# Patient Record
Sex: FEMALE | Race: WHITE | Employment: OTHER | URBAN - METROPOLITAN AREA
[De-identification: names, ages, dates, MRNs, and addresses within clinical notes are randomized per-mention and may not be internally consistent; named-entity substitution may affect disease eponyms.]

---

## 2023-01-09 ENCOUNTER — HOSPITAL ENCOUNTER (OUTPATIENT)
Dept: PHYSICAL THERAPY | Age: 78
Discharge: HOME | End: 2023-01-09
Payer: MEDICARE

## 2023-01-09 PROCEDURE — 97110 THERAPEUTIC EXERCISES: CPT

## 2023-01-09 PROCEDURE — 97161 PT EVAL LOW COMPLEX 20 MIN: CPT

## 2023-01-09 NOTE — PROGRESS NOTES
[facility] at [address] [place] [address]  Phone: [phone]   Fax: [phone]    Plan of Care/ Statement of Necessity for Physical Therapy Services     Patient name: Yvrose Rodriguez Start of Care: 2023   Referral source: Viki Bay MD : 1945    Medical Diagnosis: Pain in left leg [M79.605]  Displaced fracture of greater tuberosity of left humerus, subsequent encounter for fracture with routine healing [S42.252D]  Displaced intertrochanteric fracture of left femur, subsequent encounter for closed fracture with routine healing [S72.142D]  Pain in left shoulder [M25.512]   Onset Date:10/24/2022    Treatment Diagnosis: Left leg pain, left shoulder pain   Prior Hospitalization: see medical history Provider#: [id number]   Medications: Verified on Patient summary List    Comorbidities: Latex allergy, PCN Allergy, asthma, GERD, Dry eye, osteopenia   Prior Level of Function: 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 Assessment/ key information:   Patient is a 79 yo female that presents with left hip pain and left shoulder pain s/p closed comminuted intertrochanteric fracture with internal fixation and closed displaced fracture of greater tuberosity of the left humerus with routine healing secondary to a trip and fall over a garden hose on 2022. She presnets with reduced ROM of the left hip and left shoulder. She has reduced strength of bilateral UEs and LEs left more than right. She has reduced balance and is a risk for falls. Patient ambulates with modified independence  with 2WW demonstrating an antalgic gait pattern.  Patient presentation is consistent s/p closed comminuted intertrochanteric fracture with internal fixation and closed displaced fracture of greater tuberosity of the left humerus with routine healing. Patient will benefit from skilled PT services to modify and progress therapeutic interventions, address functional mobility deficits, address ROM deficits, address strength deficits, analyze and address soft tissue restrictions, analyze and cue movement patterns, analyze and modify body mechanics/ergonomics and assess and modify postural abnormalities to attain her goals. Evaluation Complexity History HIGH Complexity :3+ comorbidities / personal factors will impact the outcome/ POC ; Examination LOW Complexity : 1-2 Standardized tests and measures addressing body structure, function, activity limitation and / or participation in recreation  ;Presentation LOW Complexity : Stable, uncomplicated  ;Clinical Decision Making MEDIUM Complexity : FOTO score of 26-74  Overall Complexity Rating: LOW   Problem List: pain affecting function, decrease ROM, decrease strength, edema affecting function, impaired gait/ balance, decrease ADL/ functional abilitiies, decrease activity tolerance, decrease flexibility/ joint mobility, and decrease transfer abilities   Treatment Plan may include any combination of the following: Therapeutic exercise, Neuromuscular reeducation, Manual therapy, Therapeutic activity, Self care/home management, Electric stim unattended , Vasopneumatic device, Aquatic therapy, Gait training, and Electric stim attended  Patient / Family readiness to learn indicated by: asking questions, trying to perform skills, and interest  Persons(s) to be included in education: patient (P)  Barriers to Learning/Limitations: None  Measures taken if barriers to learning: NA  Patient Goal (s): get stronger in my legs and arm to get off of the walker  Patient Self Reported Health Status: good  Rehabilitation Potential: good    Short Term Goals:  To be accomplished in 6 weeks:   Patient will report compliance with HEP at least 1x/day to aid in rehabilitation program.   Status at IE: Provided initial HEP   Current:Same as IE     Patient will display right shoulder pain free AROM of 130 degrees into flexion to aid in completion of ADLs. Status at IE: 75 degrees   Current:Same as IE    Long Term Goals: To be accomplished in 12 weeks:   Patient will report compliance with HEP a least 3-4x/week to aid in rehabilitation/strengthening program.   Status at IE: NA   Current:Same as IE     Patient will report no pain greater than 1-2/10 with overhead activities to aid in completion of ADLs. Status at IE: 3/10 cannot reach overhead   Current:Same as IE     Patient will increase bilateral UE/LE strength to 4+/5 throughout all planes to aid in completion of ADLs. Status at IE: 2+/5   Current:Same as IE     Patent will perform 5 sit<>stands pain-free to demonstrate improvement in status and aid and completion of ADLs. Status at IE:Requires UEA   Current:Same as IE     Patient will increase FOTO score to 70 points overall to demonstrate improvement in functional status. Status at IE: 64   Current: Same as IE       Frequency / Duration: Patient to be seen 2 times per week for 12 weeks. Patient/ Caregiver education and instruction: Diagnosis, prognosis, self care, activity modification, and exercises   [x]  Plan of care has been reviewed with PTA    Certification Period: 1/9/2023 - 4/9/2023    Suzanne Bailey PT, DPT 1/9/2023 12:28 PM  _____________________________________________________________________  I certify that the above Therapy Services are being furnished while the patient is under my care. I agree with the treatment plan and certify that this therapy is necessary.     [de-identified] Signature:____________Date:_________TIME:________                                      Idania Gallegos MD    ** Signature, Date and Time must be completed for valid certification **    Please sign and return to   [facility] at [address]  Phone: [phone]   Fax: [phone]

## 2023-01-09 NOTE — PROGRESS NOTES
PT DAILY TREATMENT NOTE/HIP [id number]    Patient Name: Giovany Hemphill  Date:2023  : 1945  [x]  Patient  Verified  Payor: [place] MED[place]ARE / Plan: [place] MEDICARE PART A & B / Product Type: Medicare /    In time:1110  Out time:1210  Total Treatment Time (min): 60  Visit #: 1 of 24    Medicare/BCBS Only   Total Timed Codes (min):  30 1:1 Treatment Time:  60     Treatment Area: Pain in left leg [M79.605]  Pain in left arm [M79.602]  Displaced fracture of greater tuberosity of left humerus, subsequent encounter for fracture with routine healing [S42.252D]  Displaced intertrochanteric fracture of left femur, subsequent encounter for closed fracture with routine healing [S72.142D]    SUBJECTIVE  Pain Level (0-10 scale): 3  []constant [x]intermittent []improving []worsening []no change since onset    Any medication changes, allergies to medications, adverse drug reactions, diagnosis change, or new procedure performed?: [x] No    [] Yes (see summary sheet for update)  Subjective functional status/changes:       Patient presents with left hip pain and left shoulder pain s/p close comminuted intertrochanteric fracture with Internal fixation and closed fracture of greater tuberosity over the left humerus with routine healing secondary trip and fall over a garden hose on 2022. Patient describes pain as soreness, ache. Pain is worse in AM. Denies numbness/tingling. Denies popping/clicking. Aggravating factors:lifting and reaching, prolonged positioning. Alleviating factors: exercise. Denies red flags: SOB, chest pain, dizziness/lightheadedness, blurred/double vision, HA, chills/fevers, night sweats, change in bowel/bladder control, abdominal pain, difficulty swallowing, slurred speech, unexplained weight gain/loss, nausea, vomiting. PMHx: Latex allergy, PCN Allergy, asthma, GERD, Dry eye, osteopenia.  Surgical Hx: right femoral Fx with internal fixation, right humeral fracture with routine healing Social Hx: Lives with spouse in a single story home with one step to enter, work status: Retired. 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 Diagnostic Imaging: routine healing      OBJECTIVE/EXAMINATION    30 min [x]Eval                  []Re-Eval       30 min Therapeutic Exercise:  [x] See flow sheet :   Rationale: increase ROM, increase strength and decrease pain to improve the patients ability to complete ADLs       With   [x] TE   [] TA   [] neuro   [] other: Patient Education: [x] Review HEP    [] Progressed/Changed HEP based on:   [] positioning   [] body mechanics   [] transfers   [] heat/ice application    [] other:        Physical Therapy Evaluation- Hip    Posture: Forward head and rounded shoulders    Gait:  [] Normal    [] Abnormal    [x] Antalgic    [] NWB    Device: 2WW    Describe: Patient ambulates with modified independence  with 2WW demonstrating an antalgic gait pattern.          ROM/Strength        AROM                Strength (1-5)  Hip Left Right Left Right   Flexion 108 115 3+ 4-   Extension 5 15 3 4-   Abduction 25 40 2+ 4-   Adduction   3+ 4-   Knee Left Right Left Right   Extension   4- 4+   Flexion   4- 4                         Other tests/ comments:    UE Strength:   [] Unable to assess at this time                                                                            L (1-5) R (1-5) Pain   Flexion 2+ 4- [] Yes   [] No   Abduction 2+ 4- [] Yes   [] No   ER 2+ 4- [] Yes   [] No   IR 2+ 4- [] Yes   [] No   Extension 3- 4- [] Yes   [] No   Elbow flexion 3+ 4- [] Yes   [] No   Elbow Ext 3+ 4- [] Yes   [] No   Wrist Flexion 3+ 4- [] Yes   [] No   Wrist Extension 3+ 4- [] Yes   [] No   Thumb extension 3+ 4- [] Yes   [] No   Finger Abduction 3+ 4- [] Yes   [] No       Shoulder ROM: Left: flexion 75 degrees, abduction 65 degrees, IR at 0 degrees 80 ER at 0 degrees of abduction 45 degrees/ Right Shoulder flexion 135 degrees, abduction 130 degrees, IR at 0 degrees of abduction 90 ER at 0 degrees 80 degrees    Pain Level (0-10 scale) post treatment: 1    ASSESSMENT/Changes in Function:   Patient presents with left hip pain and left shoulder pain s/p closed comminuted intertrochanteric fracture with internal fixation and closed displaced fracture of greater tuberosity of the left humerus with routine healing secondary to a trip and fall over a garden hose on October 24, 2022. She presnets with reduced ROM of the left hip and left shoulder. She has reduced strength and bilateral UEs and LEs left more than right. She has reduced balance and is a risk for falls. Patient ambulates with modified independence  with 2WW demonstrating an antalgic gait pattern. Patient presentation is consistent s/p closed comminuted intertrochanteric fracture with internal fixation and closed displaced fracture of greater tuberosity of the left humerus with routine healing. Patient will benefit from skilled PT services to modify and progress therapeutic interventions, address functional mobility deficits, address ROM deficits, address strength deficits, analyze and address soft tissue restrictions, analyze and cue movement patterns, analyze and modify body mechanics/ergonomics and assess and modify postural abnormalities to attain hergoals.      [x]  See Plan of Care  []  See progress note/recertification  []  See Discharge Summary         Progress towards goals / Updated goals:  See POC    PLAN  []  Upgrade activities as tolerated     [x]  Continue plan of care  []  Update interventions per flow sheet       []  Discharge due to:_  []  Other:_      Claire Green, PT, DPT 1/9/2023  11:12 AM

## 2023-01-12 ENCOUNTER — HOSPITAL ENCOUNTER (OUTPATIENT)
Dept: PHYSICAL THERAPY | Age: 78
Discharge: HOME | End: 2023-01-12
Payer: MEDICARE

## 2023-01-12 PROCEDURE — 97110 THERAPEUTIC EXERCISES: CPT

## 2023-01-12 PROCEDURE — 97112 NEUROMUSCULAR REEDUCATION: CPT

## 2023-01-12 PROCEDURE — 97530 THERAPEUTIC ACTIVITIES: CPT

## 2023-01-12 NOTE — PROGRESS NOTES
PT DAILY TREATMENT NOTE    Patient Name: Hemanth Maza  Date:2023  : 1945  [x]  Patient  Verified  Payor: Kirsty Pierson / Plan: [place] MEDICARE PART A & B / Product Type: Medicare /    In time:9:58  Out time:10:58  Total Treatment Time (min): 60  Total Timed Codes (min): 60  1:1 Treatment Time (MC/BCBS only): 60   Visit #: 2 of 24    Treatment Dx: Pain in left leg [M79.605]  Displaced fracture of greater tuberosity of left humerus, subsequent encounter for fracture with routine healing [S42.252D]  Displaced intertrochanteric fracture of left femur, subsequent encounter for closed fracture with routine healing [S72.142D]  Pain in left shoulder [M25.512]    SUBJECTIVE  Pain Level (0-10 scale): 2/10 in left shoulder,   Any medication changes, allergies to medications, adverse drug reactions, diagnosis change, or new procedure performed?: [x] No    [] Yes (see summary sheet for update)  Subjective functional status/changes:   [] No changes reported    Patient reports she has decreased her Tylenol use from 9 daily in rehab to 2 daily currently. OBJECTIVE    30 min Therapeutic Exercise:  [] See flow sheet :   Rationale: increase ROM, increase strength, improve coordination, and increase proprioception to improve the patients ability to restore normal ROM for restore PLOF. 15 min Therapeutic Activity:  []  See flow sheet :   Rationale: increase ROM, increase strength, improve coordination, and increase proprioception  to improve the patients ability to restore ability to perform ADLs     15 min Neuromuscular Re-education:  []  See flow sheet :   Rationale: improve coordination, improve balance, and increase proprioception  to improve the patients ability to perform ADLs independently     min Gait Training:  ___ feet with ___ device on level surfaces with ___ level of assistance   Rationale:  To improve ambulation safety and efficiency in order to improve patient's ability to safely ambulate at home for self care. With   [] TE   [] TA   [] neuro   [] other: Patient Education: [x] Review HEP    [] Progressed/Changed HEP based on:   [] positioning   [] body mechanics   [] transfers   [] heat/ice application    [] other:      Other Objective/Functional Measures: see goals     Pain Level (0-10 scale) post treatment: 0    ASSESSMENT/Changes in Function: Patient reports no adverse reaction to therapy. Patient received skilled physical therapy intervention to address restricted shoulder ROM and LE weakness contributing to functional deficits. Initiated therapy program per flow sheet. Heavily educated on decreasing UT compensation and visualizing humeral head rotating in glenoid socket, individualizing to patient's self identification of being a visual learner. Reduced ROM present during SLR flex and clamshell exercises due to muscle weakness. Patient is making good progress towards goals and will benefit from continued therapy to maximize function and restore PLOF. Patient will continue to benefit from skilled PT services to modify and progress therapeutic interventions, address functional mobility deficits, address ROM deficits, address strength deficits, analyze and address soft tissue restrictions, analyze and cue movement patterns, analyze and modify body mechanics/ergonomics, assess and modify postural abnormalities, address imbalance/dizziness, and instruct in home and community integration to attain remaining goals. [x]  See Plan of Care  []  See progress note/recertification  []  See Discharge Summary         Progress towards goals / Updated goals:  Short Term Goals:  To be accomplished in 6 weeks:                 Patient will report compliance with HEP at least 1x/day to aid in rehabilitation program.                 Status at IE: Provided initial HEP                 Current: 1/12/23: Patient reports adherence 2x daily                     Patient will display right shoulder pain free AROM of 130 degrees into flexion to aid in completion of ADLs. Status at IE: 75 degrees                 Current:Same as IE     Long Term Goals: To be accomplished in 12 weeks:                 Patient will report compliance with HEP a least 3-4x/week to aid in rehabilitation/strengthening program.                 Status at IE: NA                 Current:Same as IE                    Patient will report no pain greater than 1-2/10 with overhead activities to aid in completion of ADLs. Status at IE: 3/10 cannot reach overhead                 Current:Same as IE                    Patient will increase bilateral UE/LE strength to 4+/5 throughout all planes to aid in completion of ADLs. Status at IE: 2+/5                 Current:Same as IE                    Patent will perform 5 sit<>stands pain-free to demonstrate improvement in status and aid and completion of ADLs. Status at IE:Requires UEA                 Current:Same as IE                    Patient will increase FOTO score to 70 points overall to demonstrate improvement in functional status.                   Status at IE: 64                 Current: Same as IE    PLAN  []  Upgrade activities as tolerated     [x]  Continue plan of care  []  Update interventions per flow sheet       []  Discharge due to:_  []  Other:_      Suise Mackenzie PT 1/12/2023  9:46 AM    Future Appointments   Date Time Provider Cher Young   1/12/2023 10:00 AM Jeanette [facility]   1/23/2023 11:30 AM Leslie Jorge [facility]   1/27/2023 10:30 AM Leslie Jorge [facility]   1/31/2023  3:30 PM JARRET Waller [facility]

## 2023-01-16 ENCOUNTER — HOSPITAL ENCOUNTER (OUTPATIENT)
Dept: PHYSICAL THERAPY | Age: 78
Discharge: HOME | End: 2023-01-16
Payer: MEDICARE

## 2023-01-16 PROCEDURE — 97530 THERAPEUTIC ACTIVITIES: CPT

## 2023-01-16 PROCEDURE — 97110 THERAPEUTIC EXERCISES: CPT

## 2023-01-16 NOTE — PROGRESS NOTES
PT DAILY TREATMENT NOTE    Patient Name: Flynn Muse  Date:2023  : 1945  [x]  Patient  Verified  Payor: [place] MEDICARE / Plan: [place] MEDICARE PART A & B / Product Type: Medicare /    In time: 1346  Out time: 1115  Total Treatment Time (min): 42  Total Timed Codes (min): 42  1:1 Treatment Time ( only): 29   Visit #: 3 of 24    Treatment Dx: Pain in left leg [M79.605]  Displaced fracture of greater tuberosity of left humerus, subsequent encounter for fracture with routine healing [S42.252D]  Displaced intertrochanteric fracture of left femur, subsequent encounter for closed fracture with routine healing [S72.142D]  Pain in left shoulder [M25.512]    SUBJECTIVE  Pain Level (0-10 scale): left shoulder 1-2, left leg 0  Any medication changes, allergies to medications, adverse drug reactions, diagnosis change, or new procedure performed?: [x] No    [] Yes (see summary sheet for update)  Subjective functional status/changes:   [] No changes reported  \"My  set up a shoulder pulley for me to use at home. I am working at being real consistent with the exercises. \"    OBJECTIVE    15 min Therapeutic Exercise:  [x] See flow sheet :   Rationale: increase ROM, increase strength and decrease pain to improve the patients ability to complete ADLs  ambulation safety and efficiency in order to improve patient's ability to safely ambulate at home for self care.         14 min Therapeutic Activity:  [x]  See flow sheet :   Rationale: increase ROM, increase strength and improve coordination  to improve the patients ability to Complete ADLS     With   [] TE   [] TA   [] neuro   [] other: Patient Education: [x] Review HEP    [] Progressed/Changed HEP based on:   [] positioning   [] body mechanics   [] transfers   [] heat/ice application    [] other:      Other Objective/Functional Measures: NA     Pain Level (0-10 scale) post treatment: left shoulder 1-2, left leg 0    ASSESSMENT/Changes in Function: Patient reports good consistency with HEP. Instructed patient in how to begin advancing exercise intensity as tolerated. Patient responds well to treatment session. No adverse effects were noted from today's treatment session. Patient will continue to benefit from skilled PT services to modify and progress therapeutic interventions, address functional mobility deficits, address ROM deficits, address strength deficits, analyze and address soft tissue restrictions, analyze and cue movement patterns, analyze and modify body mechanics/ergonomics, assess and modify postural abnormalities,  and instruct in home and community integration to attain remaining goals. [x]  See Plan of Care  []  See progress note/recertification  []  See Discharge Summary         Progress towards goals / Updated goals:  Short Term Goals: To be accomplished in 6 weeks:                 Patient will report compliance with HEP at least 1x/day to aid in rehabilitation program.                 Status at IE: Provided initial HEP                 Current: 1/12/23: Patient reports adherence 2x daily                     Patient will display left shoulder pain free AROM of 130 degrees into flexion to aid in completion of ADLs. Status at IE: left shoulder flex 75 degrees, abd 65 degrees                 Current: left shoulder flex 78 degrees, abd remains 65 degrees. 1/16/2023, in progress     Long Term Goals: To be accomplished in 12 weeks:                 Patient will report compliance with HEP a least 3-4x/week to aid in rehabilitation/strengthening program.                 Status at IE: NA                 Current:Same as IE                    Patient will report no pain greater than 1-2/10 with overhead activities to aid in completion of ADLs.                  Status at IE: 3/10 cannot reach overhead                 Current:Same as IE                    Patient will increase bilateral UE/LE strength to 4+/5 throughout all planes to aid in completion of ADLs. Status at IE: 2+/5                 Current:Same as IE                    Patent will perform 5 sit<>stands pain-free to demonstrate improvement in status and aid and completion of ADLs. Status at IE:Requires UEA                 Current:Same as IE                    Patient will increase FOTO score to 70 points overall to demonstrate improvement in functional status.                   Status at IE: 64                 Current: Same as IE    PLAN  []  Upgrade activities as tolerated     [x]  Continue plan of care  []  Update interventions per flow sheet       []  Discharge due to:_  []  Other:_      Nadeem Clayton PT 1/16/2023  11:06 AM    Future Appointments   Date Time Provider Cher Young   1/19/2023  4:00 PM Octavio Wall [facility]   1/23/2023 11:30 AM Dea Jorge [facility]   1/27/2023 10:30 AM Dea Jorge [facility]   1/31/2023  3:30 PM JARRET Fulton [facility]

## 2023-01-19 ENCOUNTER — HOSPITAL ENCOUNTER (OUTPATIENT)
Dept: PHYSICAL THERAPY | Age: 78
Discharge: HOME | End: 2023-01-19
Payer: MEDICARE

## 2023-01-19 PROCEDURE — 97112 NEUROMUSCULAR REEDUCATION: CPT

## 2023-01-19 PROCEDURE — 97110 THERAPEUTIC EXERCISES: CPT

## 2023-01-19 PROCEDURE — 97530 THERAPEUTIC ACTIVITIES: CPT

## 2023-01-19 NOTE — PROGRESS NOTES
PT DAILY TREATMENT NOTE    Patient Name: Viridiana Cho  Date:2023  : 1945  [x]  Patient  Verified  Payor: Alyssa Doyle / Plan: [place] / Product Type: Medicare /    In time:4:05  Out time:5:00  Total Treatment Time (min): 55  Total Timed Codes (min): 55  1:1 Treatment Time (MC/BCBS only): 54   Visit #: 4 of 24    Treatment Dx: Pain in left leg [M79.605]  Displaced fracture of greater tuberosity of left humerus, subsequent encounter for fracture with routine healing [S42.252D]  Displaced intertrochanteric fracture of left femur, subsequent encounter for closed fracture with routine healing [S72.142D]  Pain in left shoulder [M25.512]    SUBJECTIVE  Pain Level (0-10 scale): 1/10 shoulder, 0/10 LE  Any medication changes, allergies to medications, adverse drug reactions, diagnosis change, or new procedure performed?: [x] No    [] Yes (see summary sheet for update)  Subjective functional status/changes:   [] No changes reported    Patient reports she has follow-up appointment with physician who asked her about transitioning to a cane. OBJECTIVE     15 min Therapeutic Exercise:  [] See flow sheet :   Rationale: increase ROM, increase strength, improve coordination, and increase proprioception to improve the patients ability to restore normal ROM for restore PLOF.      13 min Therapeutic Activity:  []  See flow sheet :   Rationale: increase ROM, increase strength, improve coordination, and increase proprioception  to improve the patients ability to restore ability to perform ADLs     27 min Neuromuscular Re-education:  []  See flow sheet :   Rationale: improve coordination, improve balance, and increase proprioception  to improve the patients ability to perform ADLs independently          With   [] TE   [] TA   [] neuro   [] other: Patient Education: [x] Review HEP    [] Progressed/Changed HEP based on:   [] positioning   [] body mechanics   [] transfers   [] heat/ice application    [] other: Other Objective/Functional Measures: see goals     Pain Level (0-10 scale) post treatment: 0    ASSESSMENT/Changes in Function:  Patient reports no adverse reactions to therapy. Skilled therapy intervention addressed left LE strengthening and coordination in prep for decreasing AD usage. Progressed exercises to step exercise for strengthening and functional balance, with patient requiring constant use of parallel bars for balance. Patient also demonstrates decreased eccentric knee control descending from stairs. Patient is making good progress towards goals and will benefit from continued therapy to achieve goals and maximize function/restore PLOF. Patient will continue to benefit from skilled PT services to modify and progress therapeutic interventions, address functional mobility deficits, address ROM deficits, address strength deficits, analyze and address soft tissue restrictions, analyze and cue movement patterns, analyze and modify body mechanics/ergonomics, assess and modify postural abnormalities,  and instruct in home and community integration to attain remaining goals. [x]  See Plan of Care  []  See progress note/recertification  []  See Discharge Summary         Progress towards goals / Updated goals:  Short Term Goals: To be accomplished in 6 weeks:                 Patient will report compliance with HEP at least 1x/day to aid in rehabilitation program.                 Status at IE: Provided initial HEP                 Current: 1/12/23: Patient reports adherence 2x daily                     Patient will display left shoulder pain free AROM of 130 degrees into flexion to aid in completion of ADLs. Status at IE: left shoulder flex 75 degrees, abd 65 degrees                 Current: left shoulder flex 78 degrees, abd remains 65 degrees. 1/16/2023, in progress     Long Term Goals:  To be accomplished in 12 weeks:                 Patient will report compliance with HEP a least 3-4x/week to aid in rehabilitation/strengthening program.                 Status at IE: NA                 Current:Same as IE                    Patient will report no pain greater than 1-2/10 with overhead activities to aid in completion of ADLs. Status at IE: 3/10 cannot reach overhead                 Current:Same as IE                    Patient will increase bilateral UE/LE strength to 4+/5 throughout all planes to aid in completion of ADLs. Status at IE: 2+/5                 Current:Same as IE                    Patent will perform 5 sit<>stands pain-free to demonstrate improvement in status and aid and completion of ADLs. Status at IE:Requires UEA                 Current: 1/19/23: requires use of bilateral UEs to perform STS from 18\" chair                    Patient will increase FOTO score to 70 points overall to demonstrate improvement in functional status.                   Status at IE: 64                 Current: Same as IE    PLAN  []  Upgrade activities as tolerated     [x]  Continue plan of care  []  Update interventions per flow sheet       []  Discharge due to:_  []  Other:_      Funmi Puente, PT 1/19/2023  1:19 PM    Future Appointments   Date Time Provider Cher Young   1/19/2023  4:00 PM [facility]   1/23/2023 11:30 AM Mary Jorge THE [facility]   1/27/2023 10:30 AM Mary Jorge THE [facility]   1/31/2023  3:30 PM JARRET PaulaHPTAMBROSIO THE [facility]

## 2023-01-23 ENCOUNTER — HOSPITAL ENCOUNTER (OUTPATIENT)
Dept: PHYSICAL THERAPY | Age: 78
Discharge: HOME | End: 2023-01-23
Payer: MEDICARE

## 2023-01-23 PROCEDURE — 97530 THERAPEUTIC ACTIVITIES: CPT

## 2023-01-23 PROCEDURE — 97116 GAIT TRAINING THERAPY: CPT

## 2023-01-23 PROCEDURE — 97110 THERAPEUTIC EXERCISES: CPT

## 2023-01-23 PROCEDURE — 97112 NEUROMUSCULAR REEDUCATION: CPT

## 2023-01-23 NOTE — PROGRESS NOTES
PT DAILY TREATMENT NOTE    Patient Name: Amisha William  Date:2023  : 1945  [x]  Patient  Verified  Payor: [place] MEDICARE / Plan: [place] MEDICARE PART A & B / Product Type: Medicare /    In time:1134  Out time:1234  Total Treatment Time (min): 60  Total Timed Codes (min): 60  1:1 Treatment Time (MC/BCBS only): 48   Visit #: 5 of 24    Treatment Dx: Pain in left leg [M79.605]  Displaced fracture of greater tuberosity of left humerus, subsequent encounter for fracture with routine healing [S42.252D]  Displaced intertrochanteric fracture of left femur, subsequent encounter for closed fracture with routine healing [S72.142D]  Pain in left shoulder [M25.512]    SUBJECTIVE  Pain Level (0-10 scale): 2 shoulder, stiff LE  Any medication changes, allergies to medications, adverse drug reactions, diagnosis change, or new procedure performed?: [x] No    [] Yes (see summary sheet for update)  Subjective functional status/changes:   [] No changes reported  \"I haven't done the exercises this morning because I was busy so I am a little more stiff. \"    OBJECTIVE        28 min Therapeutic Exercise:  [x] See flow sheet :   Rationale: increase ROM and increase strength to improve the patients ability to perform daily activities with decreased pain and symptom levels    12 min Therapeutic Activity:  [x]  See flow sheet :   Rationale: increase ROM, increase strength, improve coordination, and increase proprioception  to improve the patients ability to perform daily activities with decreased pain and symptom levels     10 min Neuromuscular Re-education:  [x]  See flow sheet :   Rationale: improve coordination, improve balance, and increase proprioception  to improve the patients ability to perform daily activities with decreased pain and symptom levels    10 min Gait Traininft x2 with SPC in right UE with HHA first trial focusing on heel to toe and coordination of foot to UE, 50ft with RWwith emphasis of decreasing UE support and using heel to toe and continuous gait versus step to   Rationale: To improve ambulation safety and efficiency in order to improve patient's ability to safely ambulate at home for self care. With   [] TE   [] TA   [] neuro   [] other: Patient Education: [x] Review HEP    [] Progressed/Changed HEP based on:   [] positioning   [] body mechanics   [] transfers   [] heat/ice application    [] other:      Other Objective/Functional Measures:   FOTO 53  Unable to clear bottom on left with sl bridge    Lateral lean left > right with gait with SPC with CGA for balance  CGA for standing TB exercises         Pain Level (0-10 scale) post treatment: 0    ASSESSMENT/Changes in Function: Pt tolerated session well with reporting no pain post session. Good tolerance to progressing interventions today however CGA still needed at times with standing activities due to balance. Trialed walking with SPC in clinic today with good response with good coordination however CGA needed for balance. Less UE support noted with walking with RW post session. Patient will continue to benefit from skilled PT services to modify and progress therapeutic interventions, address functional mobility deficits, address ROM deficits, address strength deficits, analyze and cue movement patterns, analyze and modify body mechanics/ergonomics, assess and modify postural abnormalities, address imbalance/dizziness, and instruct in home and community integration to attain remaining goals. [x]  See Plan of Care  []  See progress note/recertification  []  See Discharge Summary         Progress towards goals / Updated goals:  Short Term Goals:  To be accomplished in 6 weeks:                 Patient will report compliance with HEP at least 1x/day to aid in rehabilitation program.                 Status at IE: Provided initial HEP                 Current: 1/12/23: Patient reports adherence 2x daily                     Patient will display left shoulder pain free AROM of 130 degrees into flexion to aid in completion of ADLs. Status at IE: left shoulder flex 75 degrees, abd 65 degrees                 Current: left shoulder flex 78 degrees, abd remains 65 degrees. 1/16/2023, in progress     Long Term Goals: To be accomplished in 12 weeks:                 Patient will report compliance with HEP a least 3-4x/week to aid in rehabilitation/strengthening program.                 Status at IE: NA                 Current:Same as IE                    Patient will report no pain greater than 1-2/10 with overhead activities to aid in completion of ADLs. Status at IE: 3/10 cannot reach overhead                 Current:Same as IE                    Patient will increase bilateral UE/LE strength to 4+/5 throughout all planes to aid in completion of ADLs. Status at IE: 2+/5                 Current:Same as IE                    Patent will perform 5 sit<>stands pain-free to demonstrate improvement in status and aid and completion of ADLs. Status at IE:Requires UEA                 Current: 1/19/23: requires use of bilateral UEs to perform STS from 18\" chair                    Patient will increase FOTO score to 70 points overall to demonstrate improvement in functional status.                   Status at IE: 64                 Current: 48 regression 1/23/23    PLAN  []  Upgrade activities as tolerated     [x]  Continue plan of care  []  Update interventions per flow sheet       []  Discharge due to:_  []  Other:_      Angeli Jorge 1/23/2023  10:46 AM    Future Appointments   Date Time Provider Cher Young   1/23/2023 11:30 AM Angeli Jorge THE [facility]   1/27/2023 10:30 AM Angeli Jorge THE [facility]   1/31/2023  3:30 PM Ricardo Lopez, PT [facility]   2/2/2023 10:00 AM Hubere Loop, PT [facility] THE [facility]   2/7/2023 10:00 AM Juanice Loop, PT [facility] THE [facility]   2/9/2023 10:00 AM Ivaanice Loop, PT [facility]   2/14/2023 10:00 AM Angelito Cordon PT [facility]   2/21/2023 10:00 AM Angelito Cordon PT [facility]   2/23/2023 10:00 AM Angelito Cordon PT [facility]   2/28/2023 10:30 AM Angelito Cordon PT [facility]

## 2023-01-27 ENCOUNTER — HOSPITAL ENCOUNTER (OUTPATIENT)
Dept: PHYSICAL THERAPY | Age: 78
Discharge: HOME | End: 2023-01-27
Payer: MEDICARE

## 2023-01-27 PROCEDURE — 97530 THERAPEUTIC ACTIVITIES: CPT

## 2023-01-27 PROCEDURE — 97116 GAIT TRAINING THERAPY: CPT

## 2023-01-27 PROCEDURE — 97110 THERAPEUTIC EXERCISES: CPT

## 2023-01-27 PROCEDURE — 97112 NEUROMUSCULAR REEDUCATION: CPT

## 2023-01-27 NOTE — PROGRESS NOTES
PT DAILY TREATMENT NOTE    Patient Name: Erin Yañez  Date:2023  : 1945  [x]  Patient  Verified  Payor: [place] MEDICARE / Plan: [place] MEDICARE PART A & B / Product Type: Medicare /    In time:1027  Out time:1128  Total Treatment Time (min): 61  Total Timed Codes (min): 61  1:1 Treatment Time (MC/BCBS only): 62   Visit #: 6 of 24    Treatment Dx: Pain in left leg [M79.605]  Displaced fracture of greater tuberosity of left humerus, subsequent encounter for fracture with routine healing [S42.252D]  Displaced intertrochanteric fracture of left femur, subsequent encounter for closed fracture with routine healing [S72.142D]  Pain in left shoulder [M25.512]    SUBJECTIVE  Pain Level (0-10 scale): 2  Any medication changes, allergies to medications, adverse drug reactions, diagnosis change, or new procedure performed?: [x] No    [] Yes (see summary sheet for update)  Subjective functional status/changes:   [] No changes reported  \"I did a lot yesterday. I see the chiropractor again on Monday to go over the . \"    OBJECTIVE        21 min Therapeutic Exercise:  [x] See flow sheet :   Rationale: increase ROM and increase strength to improve the patients ability to perform daily activities with decreased pain and symptom levels    15 min Therapeutic Activity:  [x]  See flow sheet :   Rationale: increase ROM, increase strength, and improve coordination  to improve the patients ability to perform daily activities with decreased pain and symptom levels     15 min Neuromuscular Re-education:  [x]  See flow sheet :   Rationale: improve coordination, improve balance, and increase proprioception  to improve the patients ability to perform daily activities with decreased pain and symptom levels    10 min Gait Training: with SPC in right with emphasis on increasing righ step length and step through versus step to gait pattern   Rationale:  To improve ambulation safety and efficiency in order to improve patient's ability to safely ambulate at home for self care. With   [] TE   [] TA   [] neuro   [] other: Patient Education: [x] Review HEP    [] Progressed/Changed HEP based on:   [] positioning   [] body mechanics   [] transfers   [] heat/ice application    [] other:      Other Objective/Functional Measures:   30 sec STS: 8 reps with UEA on first rep then none from 18in     Pain Level (0-10 scale) post treatment: 2 left shouder    ASSESSMENT/Changes in Function: Pt tolerated session well with reporting no increase in pain post session. Pt reporting ability to feel LE with sit to stands today with completing 8 reps in 30 sec from 18in. Challenged with maintaining some weight in heels with static balance. Decreased step length noted with bosu lateral step ups with focusing on improving with walking in clinic with [facility] today. Left UE fatigue with supine free weight exercises with more challenged with eccentric control back down to table. Patient will continue to benefit from skilled PT services to modify and progress therapeutic interventions, address functional mobility deficits, address ROM deficits, address strength deficits, analyze and cue movement patterns, analyze and modify body mechanics/ergonomics, assess and modify postural abnormalities, and instruct in home and community integration to attain remaining goals. [x]  See Plan of Care  []  See progress note/recertification  []  See Discharge Summary         Progress towards goals / Updated goals:  Short Term Goals: To be accomplished in 6 weeks:                 Patient will report compliance with HEP at least 1x/day to aid in rehabilitation program.                 Status at IE: Provided initial HEP                 Current: 1/12/23: Patient reports adherence 2x daily                     Patient will display left shoulder pain free AROM of 130 degrees into flexion to aid in completion of ADLs.                  Status at IE: left shoulder flex 75 degrees, abd 65 degrees                 Current: left shoulder flex 78 degrees, abd remains 65 degrees. 1/16/2023, in progress     Long Term Goals: To be accomplished in 12 weeks:                 Patient will report compliance with HEP a least 3-4x/week to aid in rehabilitation/strengthening program.                 Status at IE: NA                 Current:Same as IE                    Patient will report no pain greater than 1-2/10 with overhead activities to aid in completion of ADLs. Status at IE: 3/10 cannot reach overhead                 Current:Same as IE                    Patient will increase bilateral UE/LE strength to 4+/5 throughout all planes to aid in completion of ADLs. Status at IE: 2+/5                 Current:Same as IE                    Patent will perform 5 sit<>stands pain-free to demonstrate improvement in status and aid and completion of ADLs. Status at IE:Requires UEA                 Current: 30 sec STS 8 reps with UEA on first rep then none with no pain goal MET 1/27/23                    Patient will increase FOTO score to 70 points overall to demonstrate improvement in functional status.                   Status at IE: 64                 Current: 48 regression 1/23/23    PLAN  []  Upgrade activities as tolerated     [x]  Continue plan of care  []  Update interventions per flow sheet       []  Discharge due to:_  []  Other:_      Markie Jorge 1/27/2023  10:13 AM    Future Appointments   Date Time Provider Cher Young   1/27/2023 10:30 AM Markie Jorge THE [facility]   1/31/2023  3:30 PM Jl Del Angel PT TG THE [facility]   2/2/2023 10:00 AM [place] [facility] [place] [facility]   2/7/2023 10:00 AM Deepak Chacon PT [facility] [place] [facility]   2/9/2023 10:00 AM Deepak Chacon, PT TG [facility]   2/14/2023 10:00 AM Deepak Chacon, PT [facility] [place] [facility]   2/21/2023 10:00 AM Deepak Chacon, PT TG [facility]   2/23/2023 10:00 AM Deepak Chacon PT [facility]   2/28/2023 10:30 AM Pam Ferro, JARRET [facility]

## 2023-01-31 ENCOUNTER — HOSPITAL ENCOUNTER (OUTPATIENT)
Dept: PHYSICAL THERAPY | Age: 78
Discharge: HOME | End: 2023-01-31
Payer: MEDICARE

## 2023-01-31 PROCEDURE — 97112 NEUROMUSCULAR REEDUCATION: CPT

## 2023-01-31 PROCEDURE — 97110 THERAPEUTIC EXERCISES: CPT

## 2023-01-31 PROCEDURE — 97116 GAIT TRAINING THERAPY: CPT

## 2023-01-31 NOTE — PROGRESS NOTES
PT DAILY TREATMENT NOTE    Patient Name: Gab Mcmillan  Date:2023  : 1945  [x]  Patient  Verified  Payor: [place] MEDICARE / Plan: [place] MEDICARE PART A & B / Product Type: Medicare /    In time:3:35  Out time:4:30  Total Treatment Time (min): 55  Total Timed Codes (min): 55  1:1 Treatment Time (MC/BCBS only): 40   Visit #: 7 of 24    Treatment Dx: Pain in left leg [M79.605]  Displaced fracture of greater tuberosity of left humerus, subsequent encounter for fracture with routine healing [S42.252D]  Displaced intertrochanteric fracture of left femur, subsequent encounter for closed fracture with routine healing [S72.142D]  Pain in left shoulder [M25.512]    SUBJECTIVE  Pain Level (0-10 scale): 1  Any medication changes, allergies to medications, adverse drug reactions, diagnosis change, or new procedure performed?: [x] No    [] Yes (see summary sheet for update)  Subjective functional status/changes:   [] No changes reported        OBJECTIVE       15 min Therapeutic Exercise:  [x] See flow sheet :   Rationale: increase ROM and increase strength to improve the patients ability to perform daily activities with decreased pain and symptom levels     15 min Neuromuscular Re-education:  [x]  See flow sheet :   Rationale: improve coordination, improve balance, and increase proprioception  to improve the patients ability to perform daily activities with decreased pain and symptom levels     10 min Gait Training: with SPC in right with emphasis on increasing righ step length and step through versus step to gait pattern   Rationale: To improve ambulation safety and efficiency in order to improve patient's ability to safely ambulate at home for self care.            With   [] TE   [] TA   [] neuro   [] other: Patient Education: [x] Review HEP    [] Progressed/Changed HEP based on:   [] positioning   [] body mechanics   [] transfers   [] heat/ice application    [] other:      Other Objective/Functional Measures: see goals     Pain Level (0-10 scale) post treatment: 1    ASSESSMENT/Changes in Function: Patient reports no adverse reactions to therapy. Skilled therapy intervention addressed functional balance and retraining weight-bearing on feet to decrease AD usage. Demonstrates left LE weakness with inability to hold body weight for any time with balance without use of UEs. Challenged by all single leg activities. Patient is making good progress towards goals and will benefit from continued therapy to achieve goals and maximize function/restore PLOF. Patient will continue to benefit from skilled PT services to modify and progress therapeutic interventions, address functional mobility deficits, address ROM deficits, address strength deficits, analyze and cue movement patterns, analyze and modify body mechanics/ergonomics, assess and modify postural abnormalities, and instruct in home and community integration to attain remaining goals. [x]  See Plan of Care  []  See progress note/recertification  []  See Discharge Summary         Progress towards goals / Updated goals:  Short Term Goals: To be accomplished in 6 weeks:                 Patient will report compliance with HEP at least 1x/day to aid in rehabilitation program.                 Status at IE: Provided initial HEP                 Current: 1/12/23: Patient reports adherence 2x daily                     Patient will display left shoulder pain free AROM of 130 degrees into flexion to aid in completion of ADLs. Status at IE: left shoulder flex 75 degrees, abd 65 degrees                 Current: left shoulder flex 78 degrees, abd remains 65 degrees. 1/16/2023, in progress     Long Term Goals:  To be accomplished in 12 weeks:                 Patient will report compliance with HEP a least 3-4x/week to aid in rehabilitation/strengthening program.                 Status at IE: NA                 Current:Same as IE                    Patient will report no pain greater than 1-2/10 with overhead activities to aid in completion of ADLs. Status at IE: 3/10 cannot reach overhead                 Current:Same as IE                    Patient will increase bilateral UE/LE strength to 4+/5 throughout all planes to aid in completion of ADLs. Status at IE: 2+/5                 Current: 1/31/23: patient demonstrates poor LE strength necessary for single leg activities                    Patent will perform 5 sit<>stands pain-free to demonstrate improvement in status and aid and completion of ADLs. Status at IE:Requires UEA                 Current: 30 sec STS 8 reps with UEA on first rep then none with no pain goal MET 1/27/23                    Patient will increase FOTO score to 70 points overall to demonstrate improvement in functional status.                   Status at IE: 64                 Current: 48 regression 1/23/23    PLAN  []  Upgrade activities as tolerated     [x]  Continue plan of care  []  Update interventions per flow sheet       []  Discharge due to:_  []  Other:_      Dipti Bustos PT 1/31/2023  1:05 PM    Future Appointments   Date Time Provider Cher Young   1/31/2023  3:30 PM Lavern Denny [facility]   2/2/2023 10:00 AM Lizz Laurieler, PT [facility] THE [facility]   2/7/2023 10:00 AM Lizz Bailer, PT [facility] THE [facility]   2/9/2023 10:00 AM Lizz Bailer, PT [facility]   2/14/2023 10:00 AM Lizz Bailer, PT [facility]   2/21/2023 10:00 AM Lizz Bailer, PT [facility]   2/23/2023 10:00 AM Lizz Bailer, PT [facility]   2/28/2023 10:30 AM Lizz Bailer, PT [facility] THE [facility]

## 2023-02-02 ENCOUNTER — HOSPITAL ENCOUNTER (OUTPATIENT)
Dept: PHYSICAL THERAPY | Age: 78
Discharge: HOME | End: 2023-02-02
Payer: MEDICARE

## 2023-02-02 PROCEDURE — 97116 GAIT TRAINING THERAPY: CPT

## 2023-02-02 PROCEDURE — 97110 THERAPEUTIC EXERCISES: CPT

## 2023-02-02 NOTE — PROGRESS NOTES
PT DAILY TREATMENT NOTE - [facility]     Patient Name: Frieda Sarah  Date:2023  : 1945  [x]  Patient  Verified  Payor: [place] MED[place] / Plan: [place] MEDICARE PART A & B / Product Type: Medicare /    In time:1000  Out time:1030  Total Treatment Time (min): 30  Total Timed Codes (min): 30   1:1 Treatment Time ([address] only): 30   Visit #: 8 of 24    Treatment Area: Pain in left leg [M79.605]  Displaced fracture of greater tuberosity of left humerus, subsequent encounter for fracture with routine healing [S42.252D]  Displaced intertrochanteric fracture of left femur, subsequent encounter for closed fracture with routine healing [S72.142D]  Pain in left shoulder [M25.512]    SUBJECTIVE  Pain Level (0-10 scale): 0  Any medication changes, allergies to medications, adverse drug reactions, diagnosis change, or new procedure performed?: [x] No    [] Yes (see summary sheet for update)  Subjective functional status/changes:   [] No changes reported    Patient reports that she brought her cane in today to make sure it is the right height. She denies pain this morning. OBJECTIVE    20 min Therapeutic Exercise:  [x] See flow sheet :   Rationale: increase ROM, increase strength and decrease pain to improve the patients ability to complete ADLs     10 min Gait Training: with SPC in right with emphasis on increasing righ step length and step through versus step to gait pattern   Rationale: To improve ambulation safety and efficiency in order to improve patient's ability to safely ambulate at home for self care. With   [x] TE   [] TA   [] neuro   [] other: Patient Education: [x] Review HEP    [] Progressed/Changed HEP based on:   [] positioning   [] body mechanics   [] transfers   [] heat/ice application    [] other:      Other Objective/Functional Measures: NA     Pain Level (0-10 scale) post treatment: 0    ASSESSMENT/Changes in Function: Patient responds well to treatment session.  Advanced exercise by introducing seated shoulder flexion with wand. She was challenged with exercise secondary to weakness but reported no pain. Provided cues to focus on step trough gait pattern when ambulating with SPC. Adjusted cane to proper height for safety. No adverse effects were noted from today's treatment session      Patient will continue to benefit from skilled PT services to modify and progress therapeutic interventions, address functional mobility deficits, address ROM deficits, address strength deficits, analyze and address soft tissue restrictions, analyze and cue movement patterns, analyze and modify body mechanics/ergonomics, assess and modify postural abnormalities, address imbalance and instruct in home and community integration to attain remaining goals. []  See Plan of Care  []  See progress note/recertification  []  See Discharge Summary         Progress towards goals / Updated goals:  Short Term Goals: To be accomplished in 6 weeks:                 Patient will report compliance with HEP at least 1x/day to aid in rehabilitation program.                 Status at IE: Provided initial HEP                 Current: 1/12/23: Patient reports adherence 2x daily                     Patient will display left shoulder pain free AROM of 130 degrees into flexion to aid in completion of ADLs. Status at IE: left shoulder flex 75 degrees, abd 65 degrees                 Current: In-progress, AAROM left shoulder flex 120 degrees, abd remains 75 degrees. 1/16/2023       Long Term Goals: To be accomplished in 12 weeks:                 Patient will report compliance with HEP a least 3-4x/week to aid in rehabilitation/strengthening program.                 Status at IE: NA                 Current:Same as IE                    Patient will report no pain greater than 1-2/10 with overhead activities to aid in completion of ADLs.                  Status at IE: 3/10 cannot reach overhead                 Current:Same as IE Patient will increase bilateral UE/LE strength to 4+/5 throughout all planes to aid in completion of ADLs. Status at IE: 2+/5                 Current: 1/31/23: patient demonstrates poor LE strength necessary for single leg activities                    Patent will perform 5 sit<>stands pain-free to demonstrate improvement in status and aid and completion of ADLs. Status at IE:Requires UEA                 Current: 30 sec STS 8 reps with UEA on first rep then none with no pain goal MET 1/27/23                    Patient will increase FOTO score to 70 points overall to demonstrate improvement in functional status.                   Status at IE: 64                 Current: 48 regression 1/23/23    PLAN  []  Upgrade activities as tolerated     []  Continue plan of care  []  Update interventions per flow sheet       []  Discharge due to:_  []  Other:_      Nathalie Graft, PT, DPT 2/2/2023  10:12 AM    Future Appointments   Date Time Provider Cher Young   2/7/2023 10:00 AM Ameya Martines, PT [facility]   2/9/2023 10:00 AM Marcy Martines, PT [facility]   2/14/2023 10:00 AM Marcy Martines, PT [facility]   2/21/2023 10:00 AM Marcy Martines, PT [facility]   2/23/2023 10:00 AM Marcy Martines, PT [facility]   2/28/2023 10:30 AM Marcy Martines, PT [facility]

## 2023-02-07 ENCOUNTER — HOSPITAL ENCOUNTER (OUTPATIENT)
Dept: PHYSICAL THERAPY | Age: 78
Discharge: HOME | End: 2023-02-07
Payer: MEDICARE

## 2023-02-07 PROCEDURE — 97530 THERAPEUTIC ACTIVITIES: CPT

## 2023-02-07 PROCEDURE — 97110 THERAPEUTIC EXERCISES: CPT

## 2023-02-07 PROCEDURE — 97112 NEUROMUSCULAR REEDUCATION: CPT

## 2023-02-07 NOTE — PROGRESS NOTES
PT DAILY TREATMENT NOTE - [facility]     Patient Name: Hugh Carrillo  Date:2023  : 1945  [x]  Patient  Verified  Payor: [place] MEDICARE / Plan: [place] MEDICARE PART A & B / Product Type: Medicare /    In time:1000  Out time:1038  Total Treatment Time (min): 38  Total Timed Codes (min): 38   1:1 Treatment Time ( only): 45   Visit #: 9 of 24    Treatment Area: Pain in left leg [M79.605]  Displaced fracture of greater tuberosity of left humerus, subsequent encounter for fracture with routine healing [S42.252D]  Displaced intertrochanteric fracture of left femur, subsequent encounter for closed fracture with routine healing [S72.142D]  Pain in left shoulder [M25.512]    SUBJECTIVE  Pain Level (0-10 scale): 0  Any medication changes, allergies to medications, adverse drug reactions, diagnosis change, or new procedure performed?: [x] No    [] Yes (see summary sheet for update)  Subjective functional status/changes:   [] No changes reported    Patient reports that she is doing well and is compliant with HEP. She states that she is looking forward to be able to drive.      OBJECTIVE    20 min Therapeutic Exercise:  [x] See flow sheet :   Rationale: increase ROM, increase strength and decrease pain to improve the patients ability to complete ADLs    10 min Therapeutic Activity:  [x]  See flow sheet :   Rationale: increase ROM, increase strength and improve coordination  to improve the patients ability to complete ADLs     8 min Neuromuscular Re-education:  [x]  See flow sheet :   Rationale: improve coordination, improve balance and increase proprioception  to improve the patients ability to complete ADLs        With   [x] TE   [] TA   [] neuro   [] other: Patient Education: [x] Review HEP    [] Progressed/Changed HEP based on:   [] positioning   [] body mechanics   [] transfers   [] heat/ice application    [] other:      Other Objective/Functional Measures: NA     Pain Level (0-10 scale) post treatment: 0    ASSESSMENT/Changes in Function:     Patient responds well to treatment session. Patient required cues to reduce UEA with STS. She was challenged with exercise exercise. Advanced resistance with UE activities as she was demonstrating improved activity tolerance. She continues to be limited with AROM of the left shoulder. Will reassess next visit. No adverse effects were noted from today's treatment session    Patient will continue to benefit from skilled PT services to modify and progress therapeutic interventions, address functional mobility deficits, address ROM deficits, address strength deficits, analyze and address soft tissue restrictions, analyze and cue movement patterns, analyze and modify body mechanics/ergonomics, assess and modify postural abnormalities, address imbalance and instruct in home and community integration to attain remaining goals. []  See Plan of Care  []  See progress note/recertification  []  See Discharge Summary         Progress towards goals / Updated goals:  Short Term Goals: To be accomplished in 6 weeks:                 Patient will report compliance with HEP at least 1x/day to aid in rehabilitation program.                 Status at IE: Provided initial HEP                 Current: Met, 2/7/2023                    Patient will display left shoulder pain free AROM of 130 degrees into flexion to aid in completion of ADLs. Status at IE: left shoulder flex 75 degrees, abd 65 degrees                 Current: In-progress, AAROM left shoulder flex 120 degrees, and 110 degrees AROM.     2/7/2023                    Long Term Goals:  To be accomplished in 12 weeks:                 Patient will report compliance with HEP a least 3-4x/week to aid in rehabilitation/strengthening program.                 Status at IE: NA                 Current:Same as IE                    Patient will report no pain greater than 1-2/10 with overhead activities to aid in completion of ADLs.                 Status at IE: 3/10 cannot reach overhead                 Current: In-progress 0-3/10 but ROM is limited to 110 degrees of shoulder flexion, 2/7/2023                    Patient will increase bilateral UE/LE strength to 4+/5 throughout all planes to aid in completion of ADLs. Status at IE: 2+/5                 Current: In-progress, 4-/5, 2/7/2023                    Patent will perform 5 sit<>stands pain-free to demonstrate improvement in status and aid and completion of ADLs. Status at IE:Requires UEA                 Current: 30 sec STS 8 reps with UEA on first rep then none with no pain goal MET 1/27/23                    Patient will increase FOTO score to 70 points overall to demonstrate improvement in functional status.                   Status at IE: 64                 Current: 48 regression 1/23/23    PLAN  []  Upgrade activities as tolerated     [x]  Continue plan of care  []  Update interventions per flow sheet       []  Discharge due to:_  []  Other:_      Tangela Mcmillan PT, DPT 2/7/2023  10:13 AM    Future Appointments   Date Time Provider Cher Young   2/9/2023 10:00 AM Taylor Roles, PT [facility]   2/14/2023 10:00 AM Taylor Roles, PT [facility]   2/21/2023 10:00 AM Taylor Roles, PT [facility]   2/23/2023 10:00 AM Taylor Roles, PT [facility]   2/28/2023 10:30 AM Taylor Roles, PT [facility]

## 2023-02-09 ENCOUNTER — HOSPITAL ENCOUNTER (OUTPATIENT)
Dept: PHYSICAL THERAPY | Age: 78
Discharge: HOME | End: 2023-02-09
Payer: MEDICARE

## 2023-02-09 PROCEDURE — 97530 THERAPEUTIC ACTIVITIES: CPT

## 2023-02-09 PROCEDURE — 97110 THERAPEUTIC EXERCISES: CPT

## 2023-02-09 PROCEDURE — 97112 NEUROMUSCULAR REEDUCATION: CPT

## 2023-02-09 NOTE — PROGRESS NOTES
PT DAILY TREATMENT NOTE - [facility]     Patient Name: Claudell Bailer  Date:2023  : 1945  [x]  Patient  Verified  Payor: [place] MEDICARE / Plan: [place] MEDICARE PART A & B / Product Type: Medicare /    In time:1000  Out time:1055  Total Treatment Time (min): 55  Total Timed Codes (min): 55   1:1 Treatment Time ( [address] only): 54   Visit #: 10 of 24    Treatment Area: Pain in left leg [M79.605]  Displaced fracture of greater tuberosity of left humerus, subsequent encounter for fracture with routine healing [S42.252D]  Displaced intertrochanteric fracture of left femur, subsequent encounter for closed fracture with routine healing [S72.142D]  Pain in left shoulder [M25.512]    SUBJECTIVE  Pain Level (0-10 scale): 0  Any medication changes, allergies to medications, adverse drug reactions, diagnosis change, or new procedure performed?: [x] No    [] Yes (see summary sheet for update)  Subjective functional status/changes:   [] No changes reported    Patient reports that she is doing well and is ambulating in her home with a cane. She states that she is feeling stronger.      OBJECTIVE    25 min Therapeutic Exercise:  [x] See flow sheet :   Rationale: increase ROM, increase strength and decrease pain to improve the patients ability to complete ADLs    10 min Therapeutic Activity:  [x]  See flow sheet :   Rationale: increase ROM, increase strength and improve coordination  to improve the patients ability to complete ADLs     20 min Neuromuscular Re-education:  [x]  See flow sheet :   Rationale: improve coordination, improve balance and increase proprioception  to improve the patients ability to complete ADLs          With   [x] TE   [] TA   [] neuro   [] other: Patient Education: [x] Review HEP    [] Progressed/Changed HEP based on:   [] positioning   [] body mechanics   [] transfers   [] heat/ice application    [] other:      Other Objective/Functional Measures:     FOTO 23   MMT 4-/5 globally bilateral UE and LEs  Shoulder ROM Patent will perform 5 sit<>stands pain-free to demonstrate improvement in status and aid and completion of ADLs. 12 STS in 30 seconds  ROM AAROM left shoulder flex 140 degrees, and 115 degrees AROM    Pain Level (0-10 scale) post treatment: 0    ASSESSMENT/Changes in Function:     Patient responds well to treatment session. No adverse effects were noted from today's treatment session. Patient presents with left hip pain and left shoulder pain s/p closed comminuted intertrochanteric fracture with internal fixation and closed displaced fracture of greater tuberosity of the left humerus with routine healing secondary to a trip and fall over a garden hose on October 24, 2022 Patient is improving as she has met 1/2 STGs and 1/6 LTGs. She is developing strength and ROM resulting in improvised activity tolerance. She begun to transition to [facility] when ambulating on even surfaces. She is performing AAROM of the left shoulder but is limited in strength and functional mobility. Patient will continue to benefit from skilled PT services to modify and progress therapeutic interventions, address functional mobility deficits, address ROM deficits, address strength deficits, analyze and address soft tissue restrictions, analyze and cue movement patterns, analyze and modify body mechanics/ergonomics, assess and modify postural abnormalities, address imbalance and instruct in home and community integration to attain remaining goals. []  See Plan of Care  []  See progress note/recertification  []  See Discharge Summary         Progress towards goals / Updated goals:  Short Term Goals:  To be accomplished in 6 weeks:                 Patient will report compliance with HEP at least 1x/day to aid in rehabilitation program.                 Status at IE: Provided initial HEP                 Current: Met, 2/7/2023                    Patient will display left shoulder pain free AROM of 130 degrees into flexion to aid in completion of ADLs. Status at IE: left shoulder flex 75 degrees, abd 65 degrees                 Current: In-progress, AAROM left shoulder flex 140 degrees, and 115 degrees AROM.     2/9/2023                    Long Term Goals: To be accomplished in 12 weeks:                 Patient will report compliance with HEP a least 3-4x/week to aid in rehabilitation/strengthening program.                 Status at IE: NA                 Current:Same as IE                    Patient will report no pain greater than 1-2/10 with overhead activities to aid in completion of ADLs. Status at IE: 3/10 cannot reach overhead                 Current: In-progress, 0-3/10 but ROM is limited to 110 degrees of shoulder flexion, 2/7/2023                    Patient will increase bilateral UE/LE strength to 4+/5 throughout all planes to aid in completion of ADLs. Status at IE: 2+/5                 Current: In-progress, 4-/5, 2/7/2023                    Patent will perform 5 sit<>stands pain-free to demonstrate improvement in status and aid and completion of ADLs. Status at IE:Requires UEA                 Current: Met, 30 sec STS 8 reps with UEA on first rep then none with no pain, 1/27/23         Patent will perform 16 STS in 30 seconds with no UE and pain-free to demonstrate improvement in status and aid and completion of ADLs. Goal added 2/9/2023       Progress note:  12 STS, 2/9/2023       Current: NA                    Patient will increase FOTO score to 70 points overall to demonstrate improvement in functional status. Status at IE: 64                 Current:  In-progress, 66, 2/9/2023     PLAN  []  Upgrade activities as tolerated     [x]  Continue plan of care  []  Update interventions per flow sheet       []  Discharge due to:_  []  Other:_      Phil Chen, PT, DPT 2/9/2023  10:03 AM    Future Appointments   Date Time Provider Cher Young 2/14/2023 10:00 AM Reginold [place], PT [facility]   2/21/2023 10:00 AM Reginold [place], PT [facility]   2/23/2023 10:00 AM Reginold [place], PT [facility]   2/28/2023 10:30 AM Reginold [place], PT [facility]

## 2023-02-14 ENCOUNTER — HOSPITAL ENCOUNTER (OUTPATIENT)
Facility: HOSPITAL | Age: 78
Setting detail: RECURRING SERIES
Discharge: HOME | End: 2023-02-17
Payer: MEDICARE

## 2023-02-14 ENCOUNTER — APPOINTMENT (OUTPATIENT)
Dept: PHYSICAL THERAPY | Age: 78
End: 2023-02-14
Payer: MEDICARE

## 2023-02-14 PROCEDURE — 97530 THERAPEUTIC ACTIVITIES: CPT

## 2023-02-14 PROCEDURE — 97110 THERAPEUTIC EXERCISES: CPT

## 2023-02-14 PROCEDURE — 97112 NEUROMUSCULAR REEDUCATION: CPT

## 2023-02-14 NOTE — PROGRESS NOTES
PHYSICAL / OCCUPATIONAL THERAPY - DAILY TREATMENT NOTE (updated )    Patient Name: Candis Ruiz    Date: 2023    : 1945  Insurance: Payor: MEDICARE / Plan: MEDICARE PART A AND B / Product Type: *No Product type* /      Patient  verified Yes     Visit #   Current / Total 11 24   Time   In / Out 1000 1055   Pain   In / Out 0 1   Subjective Functional Status/Changes: Patient reports that her knee is feeling better. She states that she still feels safest with her walker. Changes to:  Meds, Allergies, Med Hx, Sx Hx? If yes, update Summary List no       TREATMENT AREA =  No admission diagnoses are documented for this encounter. OBJECTIVE      Therapeutic Procedures: Tx Min Billable or 1:1 Min (if diff from Tx Min) Procedure, Rationale, Specifics   25  43305 Therapeutic Exercise (timed):  increase ROM, strength, coordination, balance, and proprioception to improve patient's ability to progress to PLOF and address remaining functional goals. (see flow sheet as applicable)     Details if applicable:       20  66580 Therapeutic Activity (timed):  use of dynamic activities replicating functional movements to increase ROM, strength, coordination, balance, and proprioception in order to improve patient's ability to progress to PLOF and address remaining functional goals. (see flow sheet as applicable)     Details if applicable:     20  04581 Neuromuscular Re-Education (timed):  improve balance, coordination, kinesthetic sense, posture, core stability and proprioception to improve patient's ability to develop conscious control of individual muscles and awareness of position of extremities in order to progress to PLOF and address remaining functional goals.  (see flow sheet as applicable)     Details if applicable:     54  [address] Reminder: bill using total billable min of TIMED therapeutic procedures (example: do not include dry needle or estim unattended, both untimed codes, in totals to left)  8-22 min = 1 unit; 23-37 min = 2 units; 38-52 min = 3 units; 53-67 min = 4 units; 68-82 min = 5 units   Total Total     [x]  Patient Education billed concurrently with other procedures   [x] Review HEP    [] Progressed/Changed HEP, detail:    [] Other detail:       Objective Information/Functional Measures/Assessment    Patient demonstrated improved activity tolerance. Advanced exercise by increasing resistance during UE activities. Also had patient perform wand activities in standing too challenge dynamic stability. Provided CGA with gait belt during balance activities. Will advance exercise as tolerated. Patient will continue to benefit from skilled PT / OT services to modify and progress therapeutic interventions, analyze and address functional mobility deficits, analyze and address ROM deficits, analyze and address strength deficits, analyze and address soft tissue restrictions, analyze and cue for proper movement patterns, analyze and modify for postural abnormalities, analyze and address imbalance/dizziness, and instruct in home and community integration to address functional deficits and attain remaining goals. Progress toward goals / Updated goals:  []  See Progress Note/Recertification    Short Term Goals: To be accomplished in 6 weeks:                 Patient will report compliance with HEP at least 1x/day to aid in rehabilitation program.                 Status at IE: Provided initial HEP                 Current: Met, 2/7/2023                    Patient will display left shoulder pain free AROM of 130 degrees into flexion to aid in completion of ADLs. Status at IE: left shoulder flex 75 degrees, abd 65 degrees                 Current: In-progress, AAROM left shoulder flex 140 degrees, and 115 degrees AROM.     2/9/2023                    Long Term Goals:  To be accomplished in 12 weeks:                 Patient will report compliance with HEP a least 3-4x/week to aid in rehabilitation/strengthening program.                 Status at IE: NA                 Current:Same as IE                    Patient will report no pain greater than 1-2/10 with overhead activities to aid in completion of ADLs. Status at IE: 3/10 cannot reach overhead                 Current: In-progress, 0-3/10 but ROM is limited to 110 degrees of shoulder flexion, 2/7/2023                    Patient will increase bilateral UE/LE strength to 4+/5 throughout all planes to aid in completion of ADLs. Status at IE: 2+/5                 Current: In-progress, 4-/5, 2/7/2023                    Patent will perform 5 sit<>stands pain-free to demonstrate improvement in status and aid and completion of ADLs. Status at IE:Requires UEA                 Current: Met, 30 sec STS 8 reps with UEA on first rep then none with no pain, 1/27/23                     Patent will perform 16 STS in 30 seconds with no UE and pain-free to demonstrate improvement in status and aid and completion of ADLs. Goal added 2/9/2023                  Progress note:  12 STS, 2/9/2023                  Current: NA                     Patient will increase FOTO score to 70 points overall to demonstrate improvement in functional status. Status at IE: 64                 Current:  In-progress, 77, 2/9/2023    PLAN  Yes  Continue plan of care  []  Upgrade activities as tolerated  []  Discharge due to :  []  Other:    Anson Pizano, PT    2/14/2023    10:14 AM    Future Appointments   Date Time Provider Irineo Wilde   2/21/2023 10:00 AM ANA M Carrera [facility]   2/23/2023 10:00 AM ANA M Carrera [facility]   2/28/2023 10:30 AM Amaya Hager [facility]

## 2023-02-21 ENCOUNTER — HOSPITAL ENCOUNTER (OUTPATIENT)
Facility: HOSPITAL | Age: 78
Setting detail: RECURRING SERIES
Discharge: HOME | End: 2023-02-24
Payer: MEDICARE

## 2023-02-21 ENCOUNTER — APPOINTMENT (OUTPATIENT)
Dept: PHYSICAL THERAPY | Age: 78
End: 2023-02-21
Payer: MEDICARE

## 2023-02-21 PROCEDURE — 97112 NEUROMUSCULAR REEDUCATION: CPT

## 2023-02-21 PROCEDURE — 97530 THERAPEUTIC ACTIVITIES: CPT

## 2023-02-21 PROCEDURE — 97110 THERAPEUTIC EXERCISES: CPT

## 2023-02-21 NOTE — PROGRESS NOTES
PHYSICAL / OCCUPATIONAL THERAPY - DAILY TREATMENT NOTE (updated )    Patient Name: Mary Espino    Date: 2023    : 1945  Insurance: Payor: MEDICARE / Plan: MEDICARE PART A AND B / Product Type: *No Product type* /      Patient  verified Yes   Visit #   Current / Total 12 24   Time   In / Out 0952 1050   Pain   In / Out 0 0   Subjective Functional Status/Changes: Patient reports that her arms are sore from performing exercise while out of town. Changes to:  Meds, Allergies, Med Hx, Sx Hx? If yes, update Summary List no       TREATMENT AREA =  No admission diagnoses are documented for this encounter. OBJECTIVE    Therapeutic Procedures: Tx Min Billable or 1:1 Min (if diff from Tx Min) Procedure, Rationale, Specifics   25  28297 Therapeutic Exercise (timed):  increase ROM, strength, coordination, balance, and proprioception to improve patient's ability to progress to PLOF and address remaining functional goals. (see flow sheet as applicable)     Details if applicable:       13  37998 Therapeutic Activity (timed):  use of dynamic activities replicating functional movements to increase ROM, strength, coordination, balance, and proprioception in order to improve patient's ability to progress to PLOF and address remaining functional goals. (see flow sheet as applicable)     Details if applicable:     15  07309 Neuromuscular Re-Education (timed):  improve balance, coordination, kinesthetic sense, posture, core stability and proprioception to improve patient's ability to develop conscious control of individual muscles and awareness of position of extremities in order to progress to PLOF and address remaining functional goals.  (see flow sheet as applicable)     Details if applicable:     62  [address] Totals Reminder: bill using total billable min of TIMED therapeutic procedures (example: do not include dry needle or estim unattended, both untimed codes, in totals to left)  8-22 min = 1 unit; 23-37 min = 2 units; 38-52 min = 3 units; 53-67 min = 4 units; 68-82 min = 5 units   Total Total     [x]  Patient Education billed concurrently with other procedures   [x] Review HEP    [] Progressed/Changed HEP, detail:    [] Other detail:       Objective Information/Functional Measures/Assessment    Had patient perform rows and shoulder extension in standing today to challenge dynamic stability. Provided close supervision for safety. Provided tactile cues to promote scapular retraction during rows. Patient was challenged with obstacle course and required occasional CGA for safety. Patient will continue to benefit from skilled PT / OT services to modify and progress therapeutic interventions, analyze and address functional mobility deficits, analyze and address ROM deficits, analyze and address strength deficits, analyze and address soft tissue restrictions, analyze and cue for proper movement patterns, analyze and modify for postural abnormalities, analyze and address imbalance/dizziness, and instruct in home and community integration to address functional deficits and attain remaining goals. Progress toward goals / Updated goals:  []  See Progress Note/Recertification    Short Term Goals: To be accomplished in 6 weeks:                 Patient will report compliance with HEP at least 1x/day to aid in rehabilitation program.                 Status at IE: Provided initial HEP                 Current: Met, 2/7/2023                    Patient will display left shoulder pain free AROM of 130 degrees into flexion to aid in completion of ADLs. Status at IE: left shoulder flex 75 degrees, abd 65 degrees                 Current: In-progress, AAROM left shoulder flex 140 degrees, and 115 degrees AROM.     2/9/2023                    Long Term Goals:  To be accomplished in 12 weeks:                 Patient will report compliance with HEP a least 3-4x/week to aid in rehabilitation/strengthening program. Status at IE: NA                 Current:Same as IE                    Patient will report no pain greater than 1-2/10 with overhead activities to aid in completion of ADLs. Status at IE: 3/10 cannot reach overhead                 Current: In-progress, 0-3/10 but ROM is limited to 110 degrees of shoulder flexion, 2/7/2023                    Patient will increase bilateral UE/LE strength to 4+/5 throughout all planes to aid in completion of ADLs. Status at IE: 2+/5                 Current: In-progress, 4-/5, 2/7/2023                    Patent will perform 5 sit<>stands pain-free to demonstrate improvement in status and aid and completion of ADLs. Status at IE:Requires UEA                 Current: Met, 30 sec STS 8 reps with UEA on first rep then none with no pain, 1/27/23                     Patent will perform 16 STS in 30 seconds with no UE and pain-free to demonstrate improvement in status and aid and completion of ADLs. Goal added 2/9/2023                  Progress note:  12 STS, 2/9/2023                  Current: In-progress, 2 x 10 STS from 18.5 seat height, 2/21/2023                    Patient will increase FOTO score to 70 points overall to demonstrate improvement in functional status. Status at IE: 64                 Current:  In-progress, 66, 2/9/2023     PLAN  Yes  Continue plan of care  []  Upgrade activities as tolerated  []  Discharge due to :  []  Other:    Aida Friend, PT    2/21/2023    10:06 AM    Future Appointments   Date Time Provider Irineo Wlide   2/23/2023 10:00 AM Aiden Gore, PT [facility]   2/28/2023 10:30 AM Hiwot Alvarenga [facility]

## 2023-02-23 ENCOUNTER — APPOINTMENT (OUTPATIENT)
Dept: PHYSICAL THERAPY | Age: 78
End: 2023-02-23
Payer: MEDICARE

## 2023-02-23 ENCOUNTER — HOSPITAL ENCOUNTER (OUTPATIENT)
Facility: HOSPITAL | Age: 78
Setting detail: RECURRING SERIES
Discharge: HOME | End: 2023-02-26
Payer: MEDICARE

## 2023-02-23 PROCEDURE — 97112 NEUROMUSCULAR REEDUCATION: CPT

## 2023-02-23 PROCEDURE — 97530 THERAPEUTIC ACTIVITIES: CPT

## 2023-02-23 PROCEDURE — 97110 THERAPEUTIC EXERCISES: CPT

## 2023-02-23 NOTE — PROGRESS NOTES
PHYSICAL / OCCUPATIONAL THERAPY - DAILY TREATMENT NOTE (updated )    Patient Name: Castro Hooper    Date: 2023    : 1945  Insurance: Payor: MEDICARE / Plan: MEDICARE PART A AND B / Product Type: *No Product type* /      Patient  verified Yes   Visit #   Current / Total 14 24   Time   In / Out 1000 1100   Pain   In / Out 0 0   Subjective Functional Status/Changes: Patient reports that she has been walking with her cane more and not using her walker as much. Changes to:  Meds, Allergies, Med Hx, Sx Hx? If yes, update Summary List no       TREATMENT AREA =  No admission diagnoses are documented for this encounter. OBJECTIVE      Therapeutic Procedures: Tx Min Billable or 1:1 Min (if diff from Tx Min) Procedure, Rationale, Specifics   25  43736 Therapeutic Exercise (timed):  increase ROM, strength, coordination, balance, and proprioception to improve patient's ability to progress to PLOF and address remaining functional goals. (see flow sheet as applicable)     Details if applicable:       15  04755 Therapeutic Activity (timed):  use of dynamic activities replicating functional movements to increase ROM, strength, coordination, balance, and proprioception in order to improve patient's ability to progress to PLOF and address remaining functional goals. (see flow sheet as applicable)     Details if applicable:     20  62998 Neuromuscular Re-Education (timed):  improve balance, coordination, kinesthetic sense, posture, core stability and proprioception to improve patient's ability to develop conscious control of individual muscles and awareness of position of extremities in order to progress to PLOF and address remaining functional goals.  (see flow sheet as applicable)     Details if applicable:     61  [facility] Totals Reminder: bill using total billable min of TIMED therapeutic procedures (example: do not include dry needle or estim unattended, both untimed codes, in totals to left)  8-22 min = 1 unit; 23-37 min = 2 units; 38-52 min = 3 units; 53-67 min = 4 units; 68-82 min = 5 units   Total Total     [x]  Patient Education billed concurrently with other procedures   [x] Review HEP    [] Progressed/Changed HEP, detail:    [] Other detail:       Objective Information/Functional Measures/Assessment    Patient continues to demonstrate weakness in left hip when ambulating as she demonstrates a trendelenburg like gait pattern. Provided mirror in parallel bars to promote visual feedback to reduce trunk compensatory strategies. Will continue to focus on gait mechanics and lateral hip strength in future visits. Patient will continue to benefit from skilled PT / OT services to modify and progress therapeutic interventions, analyze and address functional mobility deficits, analyze and address ROM deficits, analyze and address strength deficits, analyze and address soft tissue restrictions, analyze and cue for proper movement patterns, analyze and modify for postural abnormalities, analyze and address imbalance/dizziness, and instruct in home and community integration to address functional deficits and attain remaining goals. Progress toward goals / Updated goals:  []  See Progress Note/Recertification    Short Term Goals: To be accomplished in 6 weeks:                 Patient will report compliance with HEP at least 1x/day to aid in rehabilitation program.                 Status at IE: Provided initial HEP                 Current: Met, 2/7/2023                    Patient will display left shoulder pain free AROM of 130 degrees into flexion to aid in completion of ADLs. Status at IE: left shoulder flex 75 degrees, abd 65 degrees                 Current: In-progress, AAROM left shoulder flex 140 degrees, and 115 degrees AROM.     2/9/2023                    Long Term Goals:  To be accomplished in 12 weeks:                 Patient will report compliance with HEP a least 3-4x/week to aid in rehabilitation/strengthening program.                 Status at IE: NA                 Current:Same as IE                    Patient will report no pain greater than 1-2/10 with overhead activities to aid in completion of ADLs. Status at IE: 3/10 cannot reach overhead                 Current: In-progress, 0-3/10 but ROM is limited to 110 degrees of shoulder flexion, 2/7/2023                    Patient will increase bilateral UE/LE strength to 4+/5 throughout all planes to aid in completion of ADLs. Status at IE: 2+/5                 Current: In-progress, 4-/5 globally, lateral 2/7/2023                    Patent will perform 5 sit<>stands pain-free to demonstrate improvement in status and aid and completion of ADLs. Status at IE:Requires UEA                 Current: Met, 30 sec STS 8 reps with UEA on first rep then none with no pain, 1/27/23                     Patent will perform 16 STS in 30 seconds with no UE and pain-free to demonstrate improvement in status and aid and completion of ADLs. Goal added 2/9/2023                  Progress note:  12 STS, 2/9/2023                  Current: In-progress, 2 x 10 STS from 18.5 seat height, 2/21/2023                    Patient will increase FOTO score to 70 points overall to demonstrate improvement in functional status. Status at IE: 64                 Current:  In-progress, 66, 2/9/2023    PLAN  Yes  Continue plan of care  []  Upgrade activities as tolerated  []  Discharge due to :  []  Other:    Mohit Bear, PT    2/23/2023    10:00 AM    Future Appointments   Date Time Provider Irineo Wilde   2/28/2023 10:30 AM aMrleni Alvarado [facility]

## 2023-02-28 ENCOUNTER — APPOINTMENT (OUTPATIENT)
Dept: PHYSICAL THERAPY | Age: 78
End: 2023-02-28
Payer: MEDICARE

## 2023-02-28 ENCOUNTER — HOSPITAL ENCOUNTER (OUTPATIENT)
Facility: HOSPITAL | Age: 78
Setting detail: RECURRING SERIES
Discharge: HOME | End: 2023-03-03
Payer: MEDICARE

## 2023-02-28 PROCEDURE — 97112 NEUROMUSCULAR REEDUCATION: CPT

## 2023-02-28 PROCEDURE — 97530 THERAPEUTIC ACTIVITIES: CPT

## 2023-02-28 PROCEDURE — 97110 THERAPEUTIC EXERCISES: CPT

## 2023-02-28 NOTE — PROGRESS NOTES
PHYSICAL / OCCUPATIONAL THERAPY - DAILY TREATMENT NOTE (updated )    Patient Name: Maritza Reeder    Date: 2023    : 1945  Insurance: Payor: MEDICARE / Plan: MEDICARE PART A AND B / Product Type: *No Product type* /      Patient  verified Yes   Visit #   Current / Total 15 24   Time   In / Out 1030 1125   Pain   In / Out 0 0   Subjective Functional Status/Changes: Patient reports compliance with HEP. Changes to:  Meds, Allergies, Med Hx, Sx Hx? If yes, update Summary List no    \       TREATMENT AREA =  No admission diagnoses are documented for this encounter. OBJECTIVE      Therapeutic Procedures: Tx Min Billable or 1:1 Min (if diff from Tx Min) Procedure, Rationale, Specifics   25  26900 Therapeutic Exercise (timed):  increase ROM, strength, coordination, balance, and proprioception to improve patient's ability to progress to PLOF and address remaining functional goals. (see flow sheet as applicable)     Details if applicable:       10  08652 Therapeutic Activity (timed):  use of dynamic activities replicating functional movements to increase ROM, strength, coordination, balance, and proprioception in order to improve patient's ability to progress to PLOF and address remaining functional goals. (see flow sheet as applicable)     Details if applicable:     20  83717 Neuromuscular Re-Education (timed):  improve balance, coordination, kinesthetic sense, posture, core stability and proprioception to improve patient's ability to develop conscious control of individual muscles and awareness of position of extremities in order to progress to PLOF and address remaining functional goals.  (see flow sheet as applicable)     Details if applicable:     54  [address] Reminder: bill using total billable min of TIMED therapeutic procedures (example: do not include dry needle or estim unattended, both untimed codes, in totals to left)  8-22 min = 1 unit; 23-37 min = 2 units; 38-52 min = 3 units; 53-67 min = 4 units; 68-82 min = 5 units   Total Total     [x]  Patient Education billed concurrently with other procedures   [x] Review HEP    [] Progressed/Changed HEP, detail:    [] Other detail:       Objective Information/Functional Measures/Assessment    Patient required CGA during obstacle course as she lack efficacy in her ability to place weight through left LE. Will continue to focus on LE strength and balance in future visits. Patient will continue to benefit from skilled PT / OT services to modify and progress therapeutic interventions, analyze and address functional mobility deficits, analyze and address ROM deficits, analyze and address strength deficits, analyze and address soft tissue restrictions, analyze and cue for proper movement patterns, analyze and modify for postural abnormalities, analyze and address imbalance/dizziness, and instruct in home and community integration to address functional deficits and attain remaining goals. Progress toward goals / Updated goals:  []  See Progress Note/Recertification    Short Term Goals: To be accomplished in 6 weeks:                 Patient will report compliance with HEP at least 1x/day to aid in rehabilitation program.                 Status at IE: Provided initial HEP                 Current: Met, 2/7/2023                    Patient will display left shoulder pain free AROM of 130 degrees into flexion to aid in completion of ADLs. Status at IE: left shoulder flex 75 degrees, abd 65 degrees                 Current: In-progress, AAROM left shoulder flex 140 degrees, and 115 degrees AROM.     2/9/2023                    Long Term Goals:  To be accomplished in 12 weeks:                 Patient will report compliance with HEP a least 3-4x/week to aid in rehabilitation/strengthening program.                 Status at IE: NA                 Current:Same as IE                    Patient will report no pain greater than 1-2/10 with overhead activities to aid in completion of ADLs. Status at IE: 3/10 cannot reach overhead                 Current: In-progress, 0-3/10 but ROM is limited to 110 degrees of shoulder flexion, 2/7/2023                    Patient will increase bilateral UE/LE strength to 4+/5 throughout all planes to aid in completion of ADLs. Status at IE: 2+/5                 Current: In-progress, 4/5 globally, lateral 2/28/2023                    Patent will perform 5 sit<>stands pain-free to demonstrate improvement in status and aid and completion of ADLs. Status at IE:Requires UEA                 Current: Met, 30 sec STS 8 reps with UEA on first rep then none with no pain, 1/27/23                     Patent will perform 16 STS in 30 seconds with no UE and pain-free to demonstrate improvement in status and aid and completion of ADLs. Goal added 2/9/2023                  Progress note:  12 STS, 2/9/2023                  Current: In-progress, 2 x 10 STS from 18.5 seat height, 2/21/2023                    Patient will increase FOTO score to 70 points overall to demonstrate improvement in functional status. Status at IE: 64                 Current:  In-progress, 66, 2/9/2023    PLAN  Yes  Continue plan of care  []  Upgrade activities as tolerated  []  Discharge due to :  []  Other:    Elie Ruiz PT    2/28/2023    11:10 AM    Future Appointments   Date Time Provider Irineo Wilde   3/2/2023  3:00 PM ANA M Simon [facility]   3/7/2023 11:00 AM ANA M Peterson [facility]   3/9/2023  1:30 PM Karmen Simon [facility]   3/14/2023 12:30 PM Francisco Young [facility]

## 2023-03-02 ENCOUNTER — HOSPITAL ENCOUNTER (OUTPATIENT)
Facility: HOSPITAL | Age: 78
Setting detail: RECURRING SERIES
Discharge: HOME | End: 2023-03-05
Payer: MEDICARE

## 2023-03-02 PROCEDURE — 97112 NEUROMUSCULAR REEDUCATION: CPT

## 2023-03-02 PROCEDURE — 97530 THERAPEUTIC ACTIVITIES: CPT

## 2023-03-02 PROCEDURE — 97110 THERAPEUTIC EXERCISES: CPT

## 2023-03-02 NOTE — PROGRESS NOTES
PHYSICAL / OCCUPATIONAL THERAPY - DAILY TREATMENT NOTE (updated )    Patient Name: Dianne Mahoney    Date: 3/2/2023    : 1945  Insurance: Payor: MEDICARE / Plan: MEDICARE PART A AND B / Product Type: *No Product type* /      Patient  verified Yes     Visit #   Current / Total 15 24   Time   In / Out 3:01 3:58   Pain   In / Out 0 0   Subjective Functional Status/Changes: Patient reports her shoulder and leg were hurting this morning, but has loosened up over the day. Changes to:  Meds, Allergies, Med Hx, Sx Hx? If yes, update Summary List no       TREATMENT AREA =  No admission diagnoses are documented for this encounter. OBJECTIVE         Therapeutic Procedures: Tx Min Billable or 1:1 Min (if diff from Tx Min) Procedure, Rationale, Specifics   25  95608 Therapeutic Exercise (timed):  increase ROM, strength, coordination, balance, and proprioception to improve patient's ability to progress to PLOF and address remaining functional goals. (see flow sheet as applicable)     Details if applicable:       19  16412 Neuromuscular Re-Education (timed):  improve balance, coordination, kinesthetic sense, posture, core stability and proprioception to improve patient's ability to develop conscious control of individual muscles and awareness of position of extremities in order to progress to PLOF and address remaining functional goals. (see flow sheet as applicable)     Details if applicable:     13  83709 Therapeutic Activity (timed):  use of dynamic activities replicating functional movements to increase ROM, strength, coordination, balance, and proprioception in order to improve patient's ability to progress to PLOF and address remaining functional goals.   (see flow sheet as applicable)     Details if applicable:            Details if applicable:            Details if applicable:     62  [address] Totals Reminder: bill using total billable min of TIMED therapeutic procedures (example: do not include dry needle or estim unattended, both untimed codes, in totals to left)  8-22 min = 1 unit; 23-37 min = 2 units; 38-52 min = 3 units; 53-67 min = 4 units; 68-82 min = 5 units   Total Total     [x]  Patient Education billed concurrently with other procedures   [x] Review HEP    [] Progressed/Changed HEP, detail:    [] Other detail:       Objective Information/Functional Measures/Assessment  Patient reports no adverse reactions to therapy. Skilled therapy intervention addressed UE and LE strengthening. Demonstrates improving functional balance during standing exercise but continues to favor right LE requiring cuing to reduce lateral weight-shift. Able to ambulate over compliant surface incline with SPC. Still unstable with stairs. Patient is making good progress towards goals and will benefit from continued therapy to achieve goals and maximize function/restore PLOF. Patient will continue to benefit from skilled PT / OT services to modify and progress therapeutic interventions, analyze and address functional mobility deficits, analyze and address ROM deficits, analyze and address strength deficits, analyze and address soft tissue restrictions, analyze and cue for proper movement patterns, analyze and modify for postural abnormalities, analyze and address imbalance/dizziness, and instruct in home and community integration to address functional deficits and attain remaining goals. Progress toward goals / Updated goals:  []  See Progress Note/Recertification    Short Term Goals: To be accomplished in 6 weeks:                 Patient will report compliance with HEP at least 1x/day to aid in rehabilitation program.                 Status at IE: Provided initial HEP                 Current: Met, 2/7/2023                    Patient will display left shoulder pain free AROM of 130 degrees into flexion to aid in completion of ADLs.                  Status at IE: left shoulder flex 75 degrees, abd 65 degrees Current: In-progress, AAROM left shoulder flex 140 degrees, and 115 degrees AROM.     2/9/2023                    Long Term Goals: To be accomplished in 12 weeks:                 Patient will report compliance with HEP a least 3-4x/week to aid in rehabilitation/strengthening program.                 Status at IE: NA                 Current: patient adherent, in progress 3/2/23                    Patient will report no pain greater than 1-2/10 with overhead activities to aid in completion of ADLs. Status at IE: 3/10 cannot reach overhead                 Current: In-progress, 0-3/10 but ROM is limited to 110 degrees of shoulder flexion, 2/7/2023                    Patient will increase bilateral UE/LE strength to 4+/5 throughout all planes to aid in completion of ADLs. Status at IE: 2+/5                 Current: In-progress, 4-/5, 2/7/2023                    Patent will perform 5 sit<>stands pain-free to demonstrate improvement in status and aid and completion of ADLs. Status at IE:Requires UEA                 Current: Met, 30 sec STS 8 reps with UEA on first rep then none with no pain, 1/27/23                     Patent will perform 16 STS in 30 seconds with no UE and pain-free to demonstrate improvement in status and aid and completion of ADLs. Goal added 2/9/2023                  Progress note:  12 STS, 2/9/2023                  Current: NA                     Patient will increase FOTO score to 70 points overall to demonstrate improvement in functional status. Status at IE: 64                 Current:  In-progress, 66, 2/9/2023       PLAN  Yes  Continue plan of care  []  Upgrade activities as tolerated  []  Discharge due to :  []  Other:    Arlyn Serrano PT    3/2/2023    3:42 PM    Future Appointments   Date Time Provider Irineo Wilde   3/7/2023 11:00 AM ANA M Agrawal [facility]   3/9/2023  1:30 PM ANA M Mas [facility]   3/14/2023 12:30 ANAYELI Hutchinson [address] [facility]

## 2023-03-07 ENCOUNTER — HOSPITAL ENCOUNTER (OUTPATIENT)
Facility: HOSPITAL | Age: 78
Setting detail: RECURRING SERIES
Discharge: HOME | End: 2023-03-10
Payer: MEDICARE

## 2023-03-07 PROCEDURE — 97110 THERAPEUTIC EXERCISES: CPT

## 2023-03-07 PROCEDURE — 97530 THERAPEUTIC ACTIVITIES: CPT

## 2023-03-07 PROCEDURE — 97112 NEUROMUSCULAR REEDUCATION: CPT

## 2023-03-07 NOTE — PROGRESS NOTES
PHYSICAL / OCCUPATIONAL THERAPY - DAILY TREATMENT NOTE (updated )    Patient Name: Gloria Parker    Date: 3/7/2023    : 1945  Insurance: Payor: MEDICARE / Plan: MEDICARE PART A AND B / Product Type: *No Product type* /      Patient  verified Yes     Visit #   Current / Total 16 24   Time   In / Out 1105 1150   Pain   In / Out 0 0   Subjective Functional Status/Changes: \"I am working hard on the exercises at home and they are helping a lot. \"   Changes to:  Meds, Allergies, Med Hx, Sx Hx? If yes, update Summary List no       TREATMENT AREA =  No admission diagnoses are documented for this encounter. OBJECTIVE    Therapeutic Procedures: Tx Min Billable or 1:1 Min (if diff from Tx Min) Procedure, Rationale, Specifics   20 15 [id number] Therapeutic Exercise (timed):  increase ROM, strength, coordination, balance, and proprioception to improve patient's ability to progress to PLOF and address remaining functional goals. (see flow sheet as applicable)     Details if applicable:       15  83009 Neuromuscular Re-Education (timed):  improve balance, coordination, kinesthetic sense, posture, core stability and proprioception to improve patient's ability to develop conscious control of individual muscles and awareness of position of extremities in order to progress to PLOF and address remaining functional goals. (see flow sheet as applicable)     Details if applicable:     10  10673 Therapeutic Activity (timed):  use of dynamic activities replicating functional movements to increase ROM, strength, coordination, balance, and proprioception in order to improve patient's ability to progress to PLOF and address remaining functional goals.   (see flow sheet as applicable)     Details if applicable:            Details if applicable:            Details if applicable:     39 40 [address] Reminder: bill using total billable min of TIMED therapeutic procedures (example: do not include dry needle or estim unattended, both untimed codes, in totals to left)  8-22 min = 1 unit; 23-37 min = 2 units; 38-52 min = 3 units; 53-67 min = 4 units; 68-82 min = 5 units   Total Total     [x]  Patient Education billed concurrently with other procedures   [x] Review HEP    [] Progressed/Changed HEP, detail:    [] Other detail:       Objective Information/Functional Measures/Assessment    Added light resistance to hip 3 way exercise with good tolerance noted. Patient provided level 1 (peach) resistance loop for use in HEP. Added more dynamic balance and gait training activities to program with good tolerance noted. Patient will continue to benefit from skilled PT / OT services to modify and progress therapeutic interventions, analyze and address functional mobility deficits, analyze and address ROM deficits, analyze and address strength deficits, analyze and address soft tissue restrictions, analyze and cue for proper movement patterns, analyze and modify for postural abnormalities, analyze and address imbalance/dizziness, and instruct in home and community integration to address functional deficits and attain remaining goals. Progress toward goals / Updated goals:  []  See Progress Note/Recertification    Short Term Goals: To be accomplished in 6 weeks:                 Patient will report compliance with HEP at least 1x/day to aid in rehabilitation program.                 Status at IE: Provided initial HEP                 Current: Met, 2/7/2023                    Patient will display left shoulder pain free AROM of 130 degrees into flexion to aid in completion of ADLs. Status at IE: left shoulder flex 75 degrees, abd 65 degrees                 Current: In-progress, AAROM left shoulder flex 140 degrees, and 115 degrees AROM.     2/9/2023                    Long Term Goals:  To be accomplished in 12 weeks:                 Patient will report compliance with HEP a least 3-4x/week to aid in rehabilitation/strengthening program. Status at IE: NA                 Current: patient adherent, in progress 3/2/23                    Patient will report no pain greater than 1-2/10 with overhead activities to aid in completion of ADLs. Status at IE: 3/10 cannot reach overhead                 Current: In-progress, 0-3/10 but ROM is limited to 110 degrees of shoulder flexion, 2/7/2023                    Patient will increase bilateral UE/LE strength to 4+/5 throughout all planes to aid in completion of ADLs. Status at IE: 2+/5                 Current: In-progress, 4/5, 3/7/2023                    Patent will perform 5 sit<>stands pain-free to demonstrate improvement in status and aid and completion of ADLs. Status at IE:Requires UEA                 Current: Met, 30 sec STS 8 reps with UEA on first rep then none with no pain, 1/27/23                     Patent will perform 16 STS in 30 seconds with no UE and pain-free to demonstrate improvement in status and aid and completion of ADLs. Goal added 2/9/2023                  Progress note:  12 STS, 2/9/2023                  Current: NA                     Patient will increase FOTO score to 70 points overall to demonstrate improvement in functional status. Status at IE: 64                 Current:  In-progress, 66, 2/9/2023    PLAN  Yes  Continue plan of care  []  Upgrade activities as tolerated  []  Discharge due to :  []  Other:    Franca Lovell, PT    3/7/2023    11:11 AM    Future Appointments   Date Time Provider Irineo Wilde   3/9/2023  1:30 PM Karmen Louis [facility]   3/14/2023 12:30 PM Jasper Warren THE [facility]

## 2023-03-09 ENCOUNTER — HOSPITAL ENCOUNTER (OUTPATIENT)
Facility: HOSPITAL | Age: 78
Setting detail: RECURRING SERIES
Discharge: HOME | End: 2023-03-12
Payer: MEDICARE

## 2023-03-09 PROCEDURE — 97530 THERAPEUTIC ACTIVITIES: CPT

## 2023-03-09 PROCEDURE — 97112 NEUROMUSCULAR REEDUCATION: CPT

## 2023-03-09 PROCEDURE — 97110 THERAPEUTIC EXERCISES: CPT

## 2023-03-09 NOTE — PROGRESS NOTES
[facility] [place] [address] [place] [address]  Phone: [phone]   Fax: [phone]    Progress Note  Patient Name: Mirza Trivedi : 1945   Medical   Diagnosis: Pain in left leg [M79.605]  Displaced fracture of greater tuberosity of left humerus, subsequent encounter for fracture with routine healing [S42.252D]  Displaced intertrochanteric fracture of left femur, subsequent encounter for closed fracture with routine healing [S72.142D]  Pain in left shoulder [M25.512] Treatment Diagnosis: Left leg pain, left shoulder pain   Onset Date: 10/24/2022       Referral Source: Bhavesh Iglesias MD Start of [facility]): 2023   Prior Hospitalization: See medical history Provider #: [id number]   Prior Level of Function: Volunte[employer] with [employer], aquatic exercise, resistance exercise, walking for exercise with no AD   Comorbidities: Latex allergy, PCN Allergy, asthma, GERD, Dry eye, osteopenia   Medications: Verified on Patient Summary List      ===========================================================================================    Visits from Start of Care: 17    Missed Visits: 0    Updated Goals/Measure of Progress:    Short Term Goals: To be accomplished in 6 weeks:                 Patient will report compliance with HEP at least 1x/day to aid in rehabilitation program.                 Status at IE: Provided initial HEP                 Current: Met, 2023                    Patient will display left shoulder pain free AROM of 130 degrees into flexion to aid in completion of ADLs. Status at IE: left shoulder flex 75 degrees, abd 65 degrees                 Current: In-progress, AROM flex: 113, degrees AROM, 80 degrees AROM    3/9/23                   Long Term Goals:  To be accomplished in 12 weeks:                 Patient will report compliance with HEP a least 3-4x/week to aid in rehabilitation/strengthening program.                 Status at IE: NA Current: patient adherent, in progress 3/9/23                    Patient will report no pain greater than 1-2/10 with overhead activities to aid in completion of ADLs. Status at IE: 3/10 cannot reach overhead                 Current: 3/9/23: patient reports no pain MET                    Patient will increase bilateral UE/LE strength to 4+/5 throughout all planes to aid in completion of ADLs. Status at IE: 2+/5                 Current: In-progress, 4/5, 3/9/2023                    Patent will perform 5 sit<>stands pain-free to demonstrate improvement in status and aid and completion of ADLs. Status at IE:Requires UEA                 Current: Met, 30 sec STS 8 reps with UEA on first rep then none with no pain, 1/27/23                     Patent will perform 16 STS in 30 seconds with no UE and pain-free to demonstrate improvement in status and aid and completion of ADLs. Goal added 2/9/2023                  Progress note:  12 STS, 2/9/2023                  Current: 12 STS on 19 in table 3/9/23 no progress                    Patient will increase FOTO score to 70 points overall to demonstrate improvement in functional status. Status at IE: 64                 Current: In-progress, 66, 2/9/2023     Patent will perform 6 to 8inch steps step over step safely with light UE. Goal added 3/9/23                  Current:  patient demonstrates left hip and pelvic depression/instability during step performance and heavy use of Ues to perform safely    Summary of Care/ Key Functional Changes:    Felicity Quinn is a 78 y.o. female with left hip pain and left shoulder pain s/p closed comminuted intertrochanteric fracture with internal fixation and closed displaced fracture of greater tuberosity of the left humerus with routine healing secondary to a trip and fall over a garden hose on October 24, 2022   Patient is improving as she has met 1/2 STGs and 2/6 LTGs. She is developing strength and ROM resulting in improvised activity tolerance. She has transitioned to walking with SPC in community distances and some household distances without safely. Her balance is improving for stepping onto/over compliant surfaces and recovering balance with her cane, but continues to have difficulty with stair performance. She is performing AROM of the left shoulder but is limited in strength and functional mobility due to fracture. She no longer has pain reaching overhead, but is weak for functional activities such as lifting dishes in to cabinet. Patient will continue to benefit from skilled PT services to modify and progress therapeutic interventions, address functional mobility deficits, address ROM deficits, address strength deficits, analyze and address soft tissue restrictions, analyze and cue movement patterns, analyze and modify body mechanics/ergonomics, assess and modify postural abnormalities, address imbalance and instruct in home and community integration to attain remaining goals. ASSESSMENT/RECOMMENDATIONS:  [x]Continue therapy per initial plan/protocol at a frequency of  2 x per week for 12 weeks    Thank you for this referral.   Geetha May, PT 3/9/2023 1:08 PM  NOTE TO PHYSICIAN:  PLEASE COMPLETE THE ORDERS BELOW AND   FAX TO [facility]: [phone]  If you are unable to process this request in 24 hours please contact our office:   [phone]  []  I have read the above report and request that my patient continue as recommended. []  I have read the above report and request that my patient continue therapy with the following changes/special instructions:________________________________________  []I have read the above report and request that my patient be discharged from therapy.

## 2023-03-09 NOTE — PROGRESS NOTES
PHYSICAL / OCCUPATIONAL THERAPY - DAILY TREATMENT NOTE (updated )    Patient Name: Dinah Rodriguez    Date: 3/9/2023    : 1945  Insurance: Payor: MEDICARE / Plan: MEDICARE PART A AND B / Product Type: *No Product type* /      Patient  verified Yes     Visit #   Current / Total 17 24   Time   In / Out 1:30 2:25   Pain   In / Out 0 0   Subjective Functional Status/Changes: Patient reports she walked across    Changes to:  Meds, Allergies, Med Hx, Sx Hx? If yes, update Summary List no       TREATMENT AREA =  Displaced intertrochanteric fracture of left femur, subsequent encounter for closed fracture with routine healing [S72.142D]  Pain in left shoulder [M25.512]    OBJECTIVE         Therapeutic Procedures: Tx Min Billable or 1:1 Min (if diff from Tx Min) Procedure, Rationale, Specifics   18  82557 Therapeutic Exercise (timed):  increase ROM, strength, coordination, balance, and proprioception to improve patient's ability to progress to PLOF and address remaining functional goals. (see flow sheet as applicable)     Details if applicable:       12  41607 Neuromuscular Re-Education (timed):  improve balance, coordination, kinesthetic sense, posture, core stability and proprioception to improve patient's ability to develop conscious control of individual muscles and awareness of position of extremities in order to progress to PLOF and address remaining functional goals. (see flow sheet as applicable)     Details if applicable:     25  51436 Therapeutic Activity (timed):  use of dynamic activities replicating functional movements to increase ROM, strength, coordination, balance, and proprioception in order to improve patient's ability to progress to PLOF and address remaining functional goals.   (see flow sheet as applicable)     Details if applicable:            Details if applicable:            Details if applicable:     54  [address] Reminder: bill using total billable min of TIMED therapeutic procedures (example: do not include dry needle or estim unattended, both untimed codes, in totals to left)  8-22 min = 1 unit; 23-37 min = 2 units; 38-52 min = 3 units; 53-67 min = 4 units; 68-82 min = 5 units   Total Total     [x]  Patient Education billed concurrently with other procedures   [x] Review HEP    [] Progressed/Changed HEP, detail:    [] Other detail:       Objective Information/Functional Measures/Assessment    Assessment / Summary of Care:  Carley Olmstead is a 78 y.o. female with left hip pain and left shoulder pain s/p closed comminuted intertrochanteric fracture with internal fixation and closed displaced fracture of greater tuberosity of the left humerus with routine healing secondary to a trip and fall over a garden hose on October 24, 2022   Patient is improving as she has met 1/2 STGs and 2/6 LTGs. She is developing strength and ROM resulting in improvised activity tolerance. She has transitioned to walking with SPC in community distances and some household distances without safely. Her balance is improving for stepping onto/over compliant surfaces and recovering balance with her cane, but continues to have difficulty with stair performance. She is performing AROM of the left shoulder but is limited in strength and functional mobility due to fracture. She no longer has pain reaching overhead, but is weak for functional activities such as lifting dishes in to cabinet. Patient will continue to benefit from skilled PT services to modify and progress therapeutic interventions, address functional mobility deficits, address ROM deficits, address strength deficits, analyze and address soft tissue restrictions, analyze and cue movement patterns, analyze and modify body mechanics/ergonomics, assess and modify postural abnormalities, address imbalance and instruct in home and community integration to attain remaining goals.        Patient will continue to benefit from skilled PT / OT services to modify and progress therapeutic interventions, analyze and address functional mobility deficits, analyze and address ROM deficits, analyze and address strength deficits, analyze and address soft tissue restrictions, analyze and cue for proper movement patterns, analyze and modify for postural abnormalities, analyze and address imbalance/dizziness, and instruct in home and community integration to address functional deficits and attain remaining goals. Progress toward goals / Updated goals:  []  See Progress Note/Recertification    Short Term Goals: To be accomplished in 6 weeks:                 Patient will report compliance with HEP at least 1x/day to aid in rehabilitation program.                 Status at IE: Provided initial HEP                 Current: Met, 2/7/2023                    Patient will display left shoulder pain free AROM of 130 degrees into flexion to aid in completion of ADLs. Status at IE: left shoulder flex 75 degrees, abd 65 degrees                 Current: In-progress, AROM flex: 113, degrees AROM, 80 degrees AROM    3/9/23                   Long Term Goals: To be accomplished in 12 weeks:                 Patient will report compliance with HEP a least 3-4x/week to aid in rehabilitation/strengthening program.                 Status at IE: NA                 Current: patient adherent, in progress 3/9/23                    Patient will report no pain greater than 1-2/10 with overhead activities to aid in completion of ADLs. Status at IE: 3/10 cannot reach overhead                 Current: 3/9/23: patient reports no pain MET                   Patient will increase bilateral UE/LE strength to 4+/5 throughout all planes to aid in completion of ADLs. Status at IE: 2+/5                 Current: In-progress, 4/5, 3/9/2023                    Patent will perform 5 sit<>stands pain-free to demonstrate improvement in status and aid and completion of ADLs. Status at IE:Requires UEA                 Current: Met, 30 sec STS 8 reps with UEA on first rep then none with no pain, 1/27/23                     Patent will perform 16 STS in 30 seconds with no UE and pain-free to demonstrate improvement in status and aid and completion of ADLs. Goal added 2/9/2023                  Progress note:  12 STS, 2/9/2023                  Current: 12 STS on 19 in table 3/9/23 no progress                    Patient will increase FOTO score to 70 points overall to demonstrate improvement in functional status. Status at IE: 64                 Current: In-progress, 66, 2/9/2023     Patent will perform 6 to 8inch steps step over step safely with light UE.  Goal added 3/9/23                  Current:  patient demonstrates left hip and pelvic depression/instability during step performance and heavy use of Ues to perform safely    PLAN  Yes  Continue plan of care  [x]  Upgrade activities as tolerated  []  Discharge due to :  []  Other:    Artur Mansfield, PT    3/9/2023    1:05 PM    Future Appointments   Date Time Provider Irineo Wilde   3/9/2023  1:30 PM Karmen Luna [facility]   3/14/2023 12:30 PM Annemarie Doshi [facility]

## 2023-03-14 ENCOUNTER — HOSPITAL ENCOUNTER (OUTPATIENT)
Facility: HOSPITAL | Age: 78
Setting detail: RECURRING SERIES
Discharge: HOME | End: 2023-03-17
Payer: MEDICARE

## 2023-03-14 PROCEDURE — 97112 NEUROMUSCULAR REEDUCATION: CPT

## 2023-03-14 PROCEDURE — 97530 THERAPEUTIC ACTIVITIES: CPT

## 2023-03-14 PROCEDURE — 97110 THERAPEUTIC EXERCISES: CPT

## 2023-03-14 NOTE — PROGRESS NOTES
PHYSICAL / OCCUPATIONAL THERAPY - DAILY TREATMENT NOTE (updated )    Patient Name: Daisy Avila    Date: 3/14/2023    : 1945  Insurance: Payor: MEDICARE / Plan: MEDICARE PART A AND B / Product Type: *No Product type* /      Patient  verified Yes   Visit #   Current / Total 17 24   Time   In / Out 1231 1325   Pain   In / Out 0 0   Subjective Functional Status/Changes: Patient reports compliance with HEP. She states that she has no pain and hasn't required medication for pain the last few days. Changes to:  Meds, Allergies, Med Hx, Sx Hx? If yes, update Summary List no       TREATMENT AREA =  No admission diagnoses are documented for this encounter. OBJECTIVE      Therapeutic Procedures: Tx Min Billable or 1:1 Min (if diff from Tx Min) Procedure, Rationale, Specifics   30  19222 Therapeutic Exercise (timed):  increase ROM, strength, coordination, balance, and proprioception to improve patient's ability to progress to PLOF and address remaining functional goals. (see flow sheet as applicable)     Details if applicable:       11  28672 Therapeutic Activity (timed):  use of dynamic activities replicating functional movements to increase ROM, strength, coordination, balance, and proprioception in order to improve patient's ability to progress to PLOF and address remaining functional goals. (see flow sheet as applicable)     Details if applicable:     15  06552 Neuromuscular Re-Education (timed):  improve balance, coordination, kinesthetic sense, posture, core stability and proprioception to improve patient's ability to develop conscious control of individual muscles and awareness of position of extremities in order to progress to PLOF and address remaining functional goals.  (see flow sheet as applicable)     Details if applicable:     64  [address] Reminder: bill using total billable min of TIMED therapeutic procedures (example: do not include dry needle or estim unattended, both untimed codes, in totals to left)  8-22 min = 1 unit; 23-37 min = 2 units; 38-52 min = 3 units; 53-67 min = 4 units; 68-82 min = 5 units   Total Total     [x]  Patient Education billed concurrently with other procedures   [x] Review HEP    [] Progressed/Changed HEP, detail:    [] Other detail:       Objective Information/Functional Measures/Assessment    Advanced exercise by increasing resistance. Provided cues for activity pacing. As she has a tendency to rush through exercise resulting in poor mechanics. She was challenged with exercise prescribed. Will advance exercise as tolerated. Patient will continue to benefit from skilled PT / OT services to modify and progress therapeutic interventions, analyze and address functional mobility deficits, analyze and address ROM deficits, analyze and address strength deficits, analyze and address soft tissue restrictions, analyze and cue for proper movement patterns, analyze and modify for postural abnormalities, analyze and address imbalance/dizziness, and instruct in home and community integration to address functional deficits and attain remaining goals. Progress toward goals / Updated goals:  []  See Progress Note/Recertification    Short Term Goals: To be accomplished in 6 weeks:                 Patient will report compliance with HEP at least 1x/day to aid in rehabilitation program.                 Status at IE: Provided initial HEP                 Current: Met, 2/7/2023                    Patient will display left shoulder pain free AROM of 130 degrees into flexion to aid in completion of ADLs. Status at IE: left shoulder flex 75 degrees, abd 65 degrees                 Current: In-progress, AROM flex: 113, degrees AROM, 80 degrees AROM    3/9/23                   Long Term Goals:  To be accomplished in 12 weeks:                 Patient will report compliance with HEP a least 3-4x/week to aid in rehabilitation/strengthening program.                 Status at IE: NA                 Current: patient adherent, in progress 3/9/23                    Patient will report no pain greater than 1-2/10 with overhead activities to aid in completion of ADLs. Status at IE: 3/10 cannot reach overhead                 Current: 3/9/23: patient reports no pain MET                    Patient will increase bilateral UE/LE strength to 4+/5 throughout all planes to aid in completion of ADLs. Status at IE: 2+/5                 Current: In-progress, 4/5, 3/9/2023                    Patent will perform 5 sit<>stands pain-free to demonstrate improvement in status and aid and completion of ADLs. Status at IE:Requires UEA                 Current: Met, 30 sec STS 8 reps with UEA on first rep then none with no pain, 1/27/23                     Patent will perform 16 STS in 30 seconds with no UE and pain-free to demonstrate improvement in status and aid and completion of ADLs. Goal added 2/9/2023                  Progress note:  12 STS, 2/9/2023                  Current: 12 STS on 19 in table 3/9/23 no progress                    Patient will increase FOTO score to 70 points overall to demonstrate improvement in functional status. Status at IE: 64                 Current: In-progress, 66, 2/9/2023     Patent will perform 6 to 8inch steps step over step safely with light UE.  Goal added 3/9/23                  Current:  patient demonstrates left hip and pelvic depression/instability during step performance and heavy use of Ues to perform safely       PLAN  Yes  Continue plan of care  []  Upgrade activities as tolerated  []  Discharge due to :  []  Other:    Jake Palm, PT    3/14/2023    12:41 PM    Future Appointments   Date Time Provider Irineo Wilde   [facility]   3/23/2023  5:00 PM Karmen Hdz [facility]   3/27/2023  3:00 PM Karmen Hdz [facility]   3/31/2023 10:00 AM Kulwant Love Mukesh Marks [facility]   4/3/2023 10:00 AM Moraima Lam [facility]

## 2023-03-17 ENCOUNTER — HOSPITAL ENCOUNTER (OUTPATIENT)
Facility: HOSPITAL | Age: 78
Setting detail: RECURRING SERIES
Discharge: HOME | End: 2023-03-20
Payer: MEDICARE

## 2023-03-17 PROCEDURE — 97110 THERAPEUTIC EXERCISES: CPT

## 2023-03-17 PROCEDURE — 97530 THERAPEUTIC ACTIVITIES: CPT

## 2023-03-17 PROCEDURE — 97112 NEUROMUSCULAR REEDUCATION: CPT

## 2023-03-17 NOTE — PROGRESS NOTES
progress 3/9/23                    Patient will report no pain greater than 1-2/10 with overhead activities to aid in completion of ADLs. Status at IE: 3/10 cannot reach overhead                 Current: 3/9/23: patient reports no pain MET                    Patient will increase bilateral UE/LE strength to 4+/5 throughout all planes to aid in completion of ADLs. Status at IE: 2+/5                 Current: In-progress, 4/5, 3/9/2023                    Patent will perform 5 sit<>stands pain-free to demonstrate improvement in status and aid and completion of ADLs. Status at IE:Requires UEA                 Current: Met, 30 sec STS 8 reps with UEA on first rep then none with no pain, 1/27/23                     Patent will perform 16 STS in 30 seconds with no UE and pain-free to demonstrate improvement in status and aid and completion of ADLs. Goal added 2/9/2023                  Progress note:  12 STS, 2/9/2023                  Current: 12 STS on 19 in table 3/9/23 no progress                    Patient will increase FOTO score to 70 points overall to demonstrate improvement in functional status. Status at IE: 64                 Current: In-progress, 66, 2/9/2023     Patent will perform 6 to 8inch steps step over step safely with light UE.  Goal added 3/9/23                  Current:  patient demonstrates left hip and pelvic depression/instability during step performance and heavy use of Ues to perform safely    PLAN  Yes  Continue plan of care  []  Upgrade activities as tolerated  []  Discharge due to :  []  Other:    Nehemiah Mejía PT    3/17/2023    1:34 PM    Future Appointments   Date Time Provider Irineo Wilde   3/23/2023  5:00 PM Raffy Hudson PT [email or web address]   3/27/2023  3:00 PM Karmen Simpson [facility]   3/31/2023 10:00 AM David Levy [facility]   4/3/2023 10:00 AM Joe Hewitt [facility]

## 2023-03-23 ENCOUNTER — HOSPITAL ENCOUNTER (OUTPATIENT)
Facility: HOSPITAL | Age: 78
Setting detail: RECURRING SERIES
Discharge: HOME | End: 2023-03-26
Payer: MEDICARE

## 2023-03-23 PROCEDURE — 97112 NEUROMUSCULAR REEDUCATION: CPT

## 2023-03-23 PROCEDURE — 97110 THERAPEUTIC EXERCISES: CPT

## 2023-03-23 PROCEDURE — 97530 THERAPEUTIC ACTIVITIES: CPT

## 2023-03-23 NOTE — PROGRESS NOTES
PHYSICAL / OCCUPATIONAL THERAPY - DAILY TREATMENT NOTE (updated )    Patient Name: Adilia Nicholas    Date: 3/23/2023    : 1945  Insurance: Payor: MEDICARE / Plan: MEDICARE PART A AND B / Product Type: *No Product type* /      Patient  verified Yes     Visit #   Current / Total 20 24   Time   In / Out 5:06 pm 5:48 pm   Pain   In / Out 0 0   Subjective Functional Status/Changes: Pt reports her knee and shoulder are sore but denies pain today. Changes to:  Meds, Allergies, Med Hx, Sx Hx? If yes, update Summary List no       TREATMENT AREA =  Left leg pain [M79.605]    OBJECTIVE    Therapeutic Procedures: Tx Min Billable or 1:1 Min (if diff from Tx Min) Procedure, Rationale, Specifics   12  84084 Therapeutic Exercise (timed):  increase ROM, strength, coordination, balance, and proprioception to improve patient's ability to progress to PLOF and address remaining functional goals. (see flow sheet as applicable)     Details if applicable:       15  05996 Therapeutic Activity (timed):  use of dynamic activities replicating functional movements to increase ROM, strength, coordination, balance, and proprioception in order to improve patient's ability to progress to PLOF and address remaining functional goals. (see flow sheet as applicable)     Details if applicable:     15  72411 Neuromuscular Re-Education (timed):  improve balance, coordination, kinesthetic sense, posture, core stability and proprioception to improve patient's ability to develop conscious control of individual muscles and awareness of position of extremities in order to progress to PLOF and address remaining functional goals.  (see flow sheet as applicable)     Details if applicable:            Details if applicable:            Details if applicable:     43  [facility] Totals Reminder: bill using total billable min of TIMED therapeutic procedures (example: do not include dry needle or estim unattended, both untimed codes, in totals to

## 2023-03-27 ENCOUNTER — HOSPITAL ENCOUNTER (OUTPATIENT)
Facility: HOSPITAL | Age: 78
Setting detail: RECURRING SERIES
Discharge: HOME | End: 2023-03-30
Payer: MEDICARE

## 2023-03-27 PROCEDURE — 97110 THERAPEUTIC EXERCISES: CPT

## 2023-03-27 PROCEDURE — 97530 THERAPEUTIC ACTIVITIES: CPT

## 2023-03-27 PROCEDURE — 97112 NEUROMUSCULAR REEDUCATION: CPT

## 2023-03-27 NOTE — PROGRESS NOTES
3/9/23                    Patient will report no pain greater than 1-2/10 with overhead activities to aid in completion of ADLs. Status at IE: 3/10 cannot reach overhead                 Current: 3/9/23: patient reports no pain MET                    Patient will increase bilateral UE/LE strength to 4+/5 throughout all planes to aid in completion of ADLs. Status at IE: 2+/5                 Current: In-progress, 4/5, 3/9/2023                    Patent will perform 5 sit<>stands pain-free to demonstrate improvement in status and aid and completion of ADLs. Status at IE:Requires UEA                 Current: Met, 30 sec STS 8 reps with UEA on first rep then none with no pain, 1/27/23                     Patent will perform 16 STS in 30 seconds with no UE and pain-free to demonstrate improvement in status and aid and completion of ADLs. Goal added 2/9/2023                  Progress note:  12 STS, 2/9/2023                  Current: 12 STS on 19 in table 3/9/23 no progress                    Patient will increase FOTO score to 70 points overall to demonstrate improvement in functional status. Status at IE: 64                 Current: In-progress, 66, 2/9/2023     Patent will perform 6 to 8inch steps step over step safely with light UE.  Goal added 3/9/23                  Current:  patient demonstrates left hip and pelvic depression/instability during step performance and heavy use of Ues to perform safely    PLAN  Yes  Continue plan of care  []  Upgrade activities as tolerated  []  Discharge due to :  []  Other:    Estrellita Gomez, PT    3/27/2023    4:12 PM    Future Appointments   Date Time Provider Irineo Wilde   3/31/2023 10:00 AM [id number] Levy [facility]   4/3/2023 10:00 AM Leroy Hernandez [facility]

## 2023-03-31 ENCOUNTER — HOSPITAL ENCOUNTER (OUTPATIENT)
Facility: HOSPITAL | Age: 78
Setting detail: RECURRING SERIES
End: 2023-03-31
Payer: MEDICARE

## 2023-03-31 PROCEDURE — 97110 THERAPEUTIC EXERCISES: CPT

## 2023-03-31 PROCEDURE — 97112 NEUROMUSCULAR REEDUCATION: CPT

## 2023-03-31 PROCEDURE — 97530 THERAPEUTIC ACTIVITIES: CPT

## 2023-03-31 NOTE — PROGRESS NOTES
shoulder flex 75 degrees, abd 65 degrees                 Current: In-progress, AROM flex: 116, abd 82 3/31/2023     Long Term Goals: To be accomplished in 12 weeks:                 Patient will report compliance with HEP a least 3-4x/week to aid in rehabilitation/strengthening program.                 Status at IE: NA                 Current: patient adherent, in progress 3/9/23                    Patient will report no pain greater than 1-2/10 with overhead activities to aid in completion of ADLs. Status at IE: 3/10 cannot reach overhead                 Current: 3/9/23: patient reports no pain MET                    Patient will increase bilateral UE/LE strength to 4+/5 throughout all planes to aid in completion of ADLs. Status at IE: 2+/5                 Current: In-progress, 4/5, 3/9/2023                    Patent will perform 5 sit<>stands pain-free to demonstrate improvement in status and aid and completion of ADLs. Status at IE:Requires UEA                 Current: Met, 30 sec STS 8 reps with UEA on first rep then none with no pain, 1/27/23                     Patent will perform 16 STS in 30 seconds with no UE and pain-free to demonstrate improvement in status and aid and completion of ADLs. Goal added 2/9/2023                  Progress note:  12 STS, 2/9/2023                  Current: 12 STS on 19 in table 3/9/23 no progress                    Patient will increase FOTO score to 70 points overall to demonstrate improvement in functional status. Status at IE: 64                 Current: In-progress, 66, 2/9/2023     Patent will perform 6 to 8inch steps step over step safely with light UE.  Goal added 3/9/23                  Current:  patient demonstrates left hip and pelvic depression/instability during step performance and heavy use of Ues to perform safely    PLAN  Yes  Continue plan of care  []  Upgrade activities as tolerated  []  Discharge due to :  [x]  Other: Plan to progress to DC over next 2 appts.     Dottie Grove, PT    3/31/2023    10:15 AM    Future Appointments   Date Time Provider Irineo Wilde   4/3/2023 10:00 AM Glenn Vargas [facility]

## 2023-04-03 ENCOUNTER — HOSPITAL ENCOUNTER (OUTPATIENT)
Facility: HOSPITAL | Age: 78
Setting detail: RECURRING SERIES
Discharge: HOME | End: 2023-04-06
Payer: MEDICARE

## 2023-04-03 PROCEDURE — 97110 THERAPEUTIC EXERCISES: CPT

## 2023-04-03 PROCEDURE — 97112 NEUROMUSCULAR REEDUCATION: CPT

## 2023-04-03 PROCEDURE — 97530 THERAPEUTIC ACTIVITIES: CPT

## 2024-12-15 ENCOUNTER — HOSPITAL ENCOUNTER (INPATIENT)
Facility: HOSPITAL | Age: 79
LOS: 2 days | Discharge: HOME | DRG: 444 | End: 2024-12-19
Attending: EMERGENCY MEDICINE | Admitting: INTERNAL MEDICINE
Payer: MEDICARE

## 2024-12-15 DIAGNOSIS — I48.91 ATRIAL FIBRILLATION, UNSPECIFIED TYPE (HCC): ICD-10-CM

## 2024-12-15 DIAGNOSIS — K81.0 ACUTE CHOLECYSTITIS: Primary | ICD-10-CM

## 2024-12-15 DIAGNOSIS — I48.0 PAROXYSMAL ATRIAL FIBRILLATION (HCC): ICD-10-CM

## 2024-12-15 DIAGNOSIS — R94.31 ABNORMAL ELECTROCARDIOGRAPHY: ICD-10-CM

## 2024-12-15 PROCEDURE — 96374 THER/PROPH/DIAG INJ IV PUSH: CPT

## 2024-12-15 PROCEDURE — 99285 EMERGENCY DEPT VISIT HI MDM: CPT

## 2024-12-15 PROCEDURE — 83690 ASSAY OF LIPASE: CPT

## 2024-12-15 PROCEDURE — 85025 COMPLETE CBC W/AUTO DIFF WBC: CPT

## 2024-12-15 PROCEDURE — 80053 COMPREHEN METABOLIC PANEL: CPT

## 2024-12-15 RX ORDER — DICYCLOMINE HYDROCHLORIDE 10 MG/1
20 CAPSULE ORAL
Status: COMPLETED | OUTPATIENT
Start: 2024-12-16 | End: 2024-12-16

## 2024-12-15 RX ORDER — ONDANSETRON 2 MG/ML
4 INJECTION INTRAMUSCULAR; INTRAVENOUS
Status: COMPLETED | OUTPATIENT
Start: 2024-12-16 | End: 2024-12-16

## 2024-12-15 ASSESSMENT — PAIN - FUNCTIONAL ASSESSMENT: PAIN_FUNCTIONAL_ASSESSMENT: 0-10

## 2024-12-15 ASSESSMENT — PAIN SCALES - GENERAL: PAINLEVEL_OUTOF10: 8

## 2024-12-16 ENCOUNTER — APPOINTMENT (OUTPATIENT)
Facility: HOSPITAL | Age: 79
DRG: 444 | End: 2024-12-16
Payer: MEDICARE

## 2024-12-16 PROBLEM — K81.0 CHOLECYSTITIS, ACUTE: Status: ACTIVE | Noted: 2024-12-16

## 2024-12-16 LAB
ALBUMIN SERPL-MCNC: 3.1 G/DL (ref 3.4–5)
ALBUMIN SERPL-MCNC: 3.5 G/DL (ref 3.4–5)
ALBUMIN/GLOB SERPL: 0.8 (ref 0.8–1.7)
ALBUMIN/GLOB SERPL: 0.9 (ref 0.8–1.7)
ALP SERPL-CCNC: 140 U/L (ref 45–117)
ALP SERPL-CCNC: 145 U/L (ref 45–117)
ALT SERPL-CCNC: 172 U/L (ref 13–56)
ALT SERPL-CCNC: 201 U/L (ref 13–56)
ANION GAP SERPL CALC-SCNC: 4 MMOL/L (ref 3–18)
ANION GAP SERPL CALC-SCNC: 7 MMOL/L (ref 3–18)
AST SERPL-CCNC: 210 U/L (ref 10–38)
AST SERPL-CCNC: 304 U/L (ref 10–38)
BASOPHILS # BLD: 0.1 K/UL (ref 0–0.1)
BASOPHILS NFR BLD: 1 % (ref 0–2)
BILIRUB DIRECT SERPL-MCNC: 0.9 MG/DL (ref 0–0.2)
BILIRUB SERPL-MCNC: 1.5 MG/DL (ref 0.2–1)
BILIRUB SERPL-MCNC: 1.6 MG/DL (ref 0.2–1)
BUN SERPL-MCNC: 11 MG/DL (ref 7–18)
BUN SERPL-MCNC: 8 MG/DL (ref 7–18)
BUN/CREAT SERPL: 14 (ref 12–20)
BUN/CREAT SERPL: 17 (ref 12–20)
CALCIUM SERPL-MCNC: 8.9 MG/DL (ref 8.5–10.1)
CALCIUM SERPL-MCNC: 9 MG/DL (ref 8.5–10.1)
CHLORIDE SERPL-SCNC: 105 MMOL/L (ref 100–111)
CHLORIDE SERPL-SCNC: 106 MMOL/L (ref 100–111)
CO2 SERPL-SCNC: 24 MMOL/L (ref 21–32)
CO2 SERPL-SCNC: 26 MMOL/L (ref 21–32)
CREAT SERPL-MCNC: 0.59 MG/DL (ref 0.6–1.3)
CREAT SERPL-MCNC: 0.66 MG/DL (ref 0.6–1.3)
DIFFERENTIAL METHOD BLD: ABNORMAL
EOSINOPHIL # BLD: 0 K/UL (ref 0–0.4)
EOSINOPHIL NFR BLD: 0 % (ref 0–5)
ERYTHROCYTE [DISTWIDTH] IN BLOOD BY AUTOMATED COUNT: 13.1 % (ref 11.6–14.5)
ERYTHROCYTE [DISTWIDTH] IN BLOOD BY AUTOMATED COUNT: 13.2 % (ref 11.6–14.5)
EST. AVERAGE GLUCOSE BLD GHB EST-MCNC: 108 MG/DL
GLOBULIN SER CALC-MCNC: 3.8 G/DL (ref 2–4)
GLOBULIN SER CALC-MCNC: 3.9 G/DL (ref 2–4)
GLUCOSE BLD STRIP.AUTO-MCNC: 108 MG/DL (ref 70–110)
GLUCOSE BLD STRIP.AUTO-MCNC: 108 MG/DL (ref 70–110)
GLUCOSE BLD STRIP.AUTO-MCNC: 121 MG/DL (ref 70–110)
GLUCOSE BLD STRIP.AUTO-MCNC: 124 MG/DL (ref 70–110)
GLUCOSE SERPL-MCNC: 131 MG/DL (ref 74–99)
GLUCOSE SERPL-MCNC: 148 MG/DL (ref 74–99)
HBA1C MFR BLD: 5.4 % (ref 4.2–5.6)
HCT VFR BLD AUTO: 38.1 % (ref 35–45)
HCT VFR BLD AUTO: 38.9 % (ref 35–45)
HGB BLD-MCNC: 12.9 G/DL (ref 12–16)
HGB BLD-MCNC: 13.1 G/DL (ref 12–16)
IMM GRANULOCYTES # BLD AUTO: 0 K/UL (ref 0–0.04)
IMM GRANULOCYTES NFR BLD AUTO: 0 % (ref 0–0.5)
LIPASE SERPL-CCNC: 24 U/L (ref 13–75)
LYMPHOCYTES # BLD: 1 K/UL (ref 0.9–3.6)
LYMPHOCYTES NFR BLD: 14 % (ref 21–52)
MAGNESIUM SERPL-MCNC: 1.9 MG/DL (ref 1.6–2.6)
MCH RBC QN AUTO: 31.3 PG (ref 24–34)
MCH RBC QN AUTO: 31.6 PG (ref 24–34)
MCHC RBC AUTO-ENTMCNC: 33.7 G/DL (ref 31–37)
MCHC RBC AUTO-ENTMCNC: 33.9 G/DL (ref 31–37)
MCV RBC AUTO: 93.1 FL (ref 78–100)
MCV RBC AUTO: 93.4 FL (ref 78–100)
MONOCYTES # BLD: 0.7 K/UL (ref 0.05–1.2)
MONOCYTES NFR BLD: 9 % (ref 3–10)
NEUTS SEG # BLD: 5.6 K/UL (ref 1.8–8)
NEUTS SEG NFR BLD: 75 % (ref 40–73)
NRBC # BLD: 0 K/UL (ref 0–0.01)
NRBC # BLD: 0 K/UL (ref 0–0.01)
NRBC BLD-RTO: 0 PER 100 WBC
NRBC BLD-RTO: 0 PER 100 WBC
PLATELET # BLD AUTO: 141 K/UL (ref 135–420)
PLATELET # BLD AUTO: 144 K/UL (ref 135–420)
PMV BLD AUTO: 10 FL (ref 9.2–11.8)
PMV BLD AUTO: 10.1 FL (ref 9.2–11.8)
POTASSIUM SERPL-SCNC: 3.7 MMOL/L (ref 3.5–5.5)
POTASSIUM SERPL-SCNC: 3.7 MMOL/L (ref 3.5–5.5)
PROT SERPL-MCNC: 7 G/DL (ref 6.4–8.2)
PROT SERPL-MCNC: 7.3 G/DL (ref 6.4–8.2)
RBC # BLD AUTO: 4.08 M/UL (ref 4.2–5.3)
RBC # BLD AUTO: 4.18 M/UL (ref 4.2–5.3)
SODIUM SERPL-SCNC: 136 MMOL/L (ref 136–145)
SODIUM SERPL-SCNC: 136 MMOL/L (ref 136–145)
WBC # BLD AUTO: 7.4 K/UL (ref 4.6–13.2)
WBC # BLD AUTO: 9.8 K/UL (ref 4.6–13.2)

## 2024-12-16 PROCEDURE — 94640 AIRWAY INHALATION TREATMENT: CPT

## 2024-12-16 PROCEDURE — 6370000000 HC RX 637 (ALT 250 FOR IP): Performed by: INTERNAL MEDICINE

## 2024-12-16 PROCEDURE — 96376 TX/PRO/DX INJ SAME DRUG ADON: CPT

## 2024-12-16 PROCEDURE — 6360000002 HC RX W HCPCS: Performed by: INTERNAL MEDICINE

## 2024-12-16 PROCEDURE — 6370000000 HC RX 637 (ALT 250 FOR IP): Performed by: HOSPITALIST

## 2024-12-16 PROCEDURE — 2580000003 HC RX 258: Performed by: EMERGENCY MEDICINE

## 2024-12-16 PROCEDURE — 76705 ECHO EXAM OF ABDOMEN: CPT

## 2024-12-16 PROCEDURE — G0378 HOSPITAL OBSERVATION PER HR: HCPCS

## 2024-12-16 PROCEDURE — 96361 HYDRATE IV INFUSION ADD-ON: CPT

## 2024-12-16 PROCEDURE — 96365 THER/PROPH/DIAG IV INF INIT: CPT

## 2024-12-16 PROCEDURE — 96367 TX/PROPH/DG ADDL SEQ IV INF: CPT

## 2024-12-16 PROCEDURE — 36415 COLL VENOUS BLD VENIPUNCTURE: CPT

## 2024-12-16 PROCEDURE — 6360000004 HC RX CONTRAST MEDICATION: Performed by: SURGERY

## 2024-12-16 PROCEDURE — 96366 THER/PROPH/DIAG IV INF ADDON: CPT

## 2024-12-16 PROCEDURE — A9577 INJ MULTIHANCE: HCPCS | Performed by: SURGERY

## 2024-12-16 PROCEDURE — 85027 COMPLETE CBC AUTOMATED: CPT

## 2024-12-16 PROCEDURE — 83735 ASSAY OF MAGNESIUM: CPT

## 2024-12-16 PROCEDURE — 74183 MRI ABD W/O CNTR FLWD CNTR: CPT

## 2024-12-16 PROCEDURE — 6360000002 HC RX W HCPCS: Performed by: EMERGENCY MEDICINE

## 2024-12-16 PROCEDURE — 96375 TX/PRO/DX INJ NEW DRUG ADDON: CPT

## 2024-12-16 PROCEDURE — 6360000002 HC RX W HCPCS: Performed by: HOSPITALIST

## 2024-12-16 PROCEDURE — 83036 HEMOGLOBIN GLYCOSYLATED A1C: CPT

## 2024-12-16 PROCEDURE — 6370000000 HC RX 637 (ALT 250 FOR IP): Performed by: EMERGENCY MEDICINE

## 2024-12-16 PROCEDURE — 82962 GLUCOSE BLOOD TEST: CPT

## 2024-12-16 PROCEDURE — 2500000003 HC RX 250 WO HCPCS: Performed by: EMERGENCY MEDICINE

## 2024-12-16 PROCEDURE — 80053 COMPREHEN METABOLIC PANEL: CPT

## 2024-12-16 PROCEDURE — 2580000003 HC RX 258: Performed by: INTERNAL MEDICINE

## 2024-12-16 PROCEDURE — 82248 BILIRUBIN DIRECT: CPT

## 2024-12-16 RX ORDER — METRONIDAZOLE 500 MG/100ML
500 INJECTION, SOLUTION INTRAVENOUS
Status: COMPLETED | OUTPATIENT
Start: 2024-12-16 | End: 2024-12-16

## 2024-12-16 RX ORDER — TRIAMCINOLONE ACETONIDE 0.25 MG/G
CREAM TOPICAL EVERY 4 HOURS PRN
COMMUNITY

## 2024-12-16 RX ORDER — INSULIN LISPRO 100 [IU]/ML
0-8 INJECTION, SOLUTION INTRAVENOUS; SUBCUTANEOUS EVERY 6 HOURS SCHEDULED
Status: DISCONTINUED | OUTPATIENT
Start: 2024-12-16 | End: 2024-12-19 | Stop reason: HOSPADM

## 2024-12-16 RX ORDER — ALBUTEROL SULFATE 0.83 MG/ML
SOLUTION RESPIRATORY (INHALATION) EVERY 6 HOURS PRN
Status: DISCONTINUED | OUTPATIENT
Start: 2024-12-16 | End: 2024-12-19 | Stop reason: HOSPADM

## 2024-12-16 RX ORDER — ACETAMINOPHEN 325 MG/1
650 TABLET ORAL EVERY 4 HOURS PRN
Status: DISCONTINUED | OUTPATIENT
Start: 2024-12-16 | End: 2024-12-19 | Stop reason: HOSPADM

## 2024-12-16 RX ORDER — HYDRALAZINE HYDROCHLORIDE 20 MG/ML
20 INJECTION INTRAMUSCULAR; INTRAVENOUS EVERY 4 HOURS PRN
Status: DISCONTINUED | OUTPATIENT
Start: 2024-12-16 | End: 2024-12-19 | Stop reason: HOSPADM

## 2024-12-16 RX ORDER — ENOXAPARIN SODIUM 100 MG/ML
40 INJECTION SUBCUTANEOUS DAILY
Status: DISCONTINUED | OUTPATIENT
Start: 2024-12-16 | End: 2024-12-16

## 2024-12-16 RX ORDER — HYDRALAZINE HYDROCHLORIDE 20 MG/ML
10 INJECTION INTRAMUSCULAR; INTRAVENOUS EVERY 4 HOURS PRN
Status: DISCONTINUED | OUTPATIENT
Start: 2024-12-16 | End: 2024-12-16

## 2024-12-16 RX ORDER — POLYETHYLENE GLYCOL 3350 17 G/17G
17 POWDER, FOR SOLUTION ORAL DAILY PRN
Status: DISCONTINUED | OUTPATIENT
Start: 2024-12-16 | End: 2024-12-19 | Stop reason: HOSPADM

## 2024-12-16 RX ORDER — FLUTICASONE PROPIONATE 50 MCG
1 SPRAY, SUSPENSION (ML) NASAL DAILY PRN
COMMUNITY

## 2024-12-16 RX ORDER — BUDESONIDE AND FORMOTEROL FUMARATE DIHYDRATE 80; 4.5 UG/1; UG/1
2 AEROSOL RESPIRATORY (INHALATION) 2 TIMES DAILY
COMMUNITY

## 2024-12-16 RX ORDER — DEXTROSE MONOHYDRATE 100 MG/ML
INJECTION, SOLUTION INTRAVENOUS CONTINUOUS PRN
Status: DISCONTINUED | OUTPATIENT
Start: 2024-12-16 | End: 2024-12-19 | Stop reason: HOSPADM

## 2024-12-16 RX ORDER — SODIUM CHLORIDE 9 MG/ML
INJECTION, SOLUTION INTRAVENOUS CONTINUOUS
Status: DISPENSED | OUTPATIENT
Start: 2024-12-16 | End: 2024-12-17

## 2024-12-16 RX ORDER — MONTELUKAST SODIUM 10 MG/1
10 TABLET ORAL NIGHTLY
Status: DISCONTINUED | OUTPATIENT
Start: 2024-12-16 | End: 2024-12-19 | Stop reason: HOSPADM

## 2024-12-16 RX ORDER — TRIAMCINOLONE ACETONIDE 1 MG/G
CREAM TOPICAL 2 TIMES DAILY PRN
COMMUNITY

## 2024-12-16 RX ORDER — ENOXAPARIN SODIUM 100 MG/ML
40 INJECTION SUBCUTANEOUS DAILY
Status: DISCONTINUED | OUTPATIENT
Start: 2024-12-16 | End: 2024-12-17

## 2024-12-16 RX ORDER — ONDANSETRON 2 MG/ML
4 INJECTION INTRAMUSCULAR; INTRAVENOUS EVERY 6 HOURS PRN
Status: DISCONTINUED | OUTPATIENT
Start: 2024-12-16 | End: 2024-12-19 | Stop reason: HOSPADM

## 2024-12-16 RX ORDER — MONTELUKAST SODIUM 10 MG/1
10 TABLET ORAL NIGHTLY
COMMUNITY

## 2024-12-16 RX ORDER — ACETAMINOPHEN 325 MG/1
325 TABLET ORAL 3 TIMES DAILY PRN
COMMUNITY

## 2024-12-16 RX ORDER — METRONIDAZOLE 500 MG/100ML
500 INJECTION, SOLUTION INTRAVENOUS EVERY 8 HOURS
Status: DISCONTINUED | OUTPATIENT
Start: 2024-12-16 | End: 2024-12-19 | Stop reason: HOSPADM

## 2024-12-16 RX ORDER — SODIUM CHLORIDE 0.9 % (FLUSH) 0.9 %
5-40 SYRINGE (ML) INJECTION PRN
Status: DISCONTINUED | OUTPATIENT
Start: 2024-12-16 | End: 2024-12-19 | Stop reason: HOSPADM

## 2024-12-16 RX ORDER — LEVOFLOXACIN 5 MG/ML
750 INJECTION, SOLUTION INTRAVENOUS EVERY 24 HOURS
Status: DISCONTINUED | OUTPATIENT
Start: 2024-12-16 | End: 2024-12-19 | Stop reason: HOSPADM

## 2024-12-16 RX ORDER — CIPROFLOXACIN 2 MG/ML
400 INJECTION, SOLUTION INTRAVENOUS
Status: DISCONTINUED | OUTPATIENT
Start: 2024-12-16 | End: 2024-12-16

## 2024-12-16 RX ORDER — ALBUTEROL SULFATE 90 UG/1
2 INHALANT RESPIRATORY (INHALATION)
COMMUNITY

## 2024-12-16 RX ORDER — CYCLOSPORINE 0.5 MG/ML
1 EMULSION OPHTHALMIC 3 TIMES DAILY
COMMUNITY

## 2024-12-16 RX ORDER — POTASSIUM CHLORIDE 1500 MG/1
20 TABLET, EXTENDED RELEASE ORAL ONCE
Status: COMPLETED | OUTPATIENT
Start: 2024-12-16 | End: 2024-12-16

## 2024-12-16 RX ORDER — SODIUM CHLORIDE 9 MG/ML
INJECTION, SOLUTION INTRAVENOUS PRN
Status: DISCONTINUED | OUTPATIENT
Start: 2024-12-16 | End: 2024-12-19 | Stop reason: HOSPADM

## 2024-12-16 RX ORDER — CLOTRIMAZOLE 1 %
CREAM (GRAM) TOPICAL 2 TIMES DAILY
COMMUNITY

## 2024-12-16 RX ORDER — BISACODYL 5 MG/1
5 TABLET, DELAYED RELEASE ORAL DAILY PRN
Status: DISCONTINUED | OUTPATIENT
Start: 2024-12-16 | End: 2024-12-19 | Stop reason: HOSPADM

## 2024-12-16 RX ORDER — ASCORBIC ACID 125 MG
113.5 TABLET,CHEWABLE ORAL 2 TIMES DAILY
COMMUNITY

## 2024-12-16 RX ORDER — IPRATROPIUM BROMIDE AND ALBUTEROL SULFATE 2.5; .5 MG/3ML; MG/3ML
1 SOLUTION RESPIRATORY (INHALATION)
Status: DISCONTINUED | OUTPATIENT
Start: 2024-12-16 | End: 2024-12-17

## 2024-12-16 RX ORDER — PENTOXIFYLLINE 400 MG/1
400 TABLET, EXTENDED RELEASE ORAL 2 TIMES DAILY
COMMUNITY

## 2024-12-16 RX ORDER — PENTOXIFYLLINE 400 MG/1
400 TABLET, EXTENDED RELEASE ORAL 2 TIMES DAILY
Status: DISCONTINUED | OUTPATIENT
Start: 2024-12-16 | End: 2024-12-19 | Stop reason: HOSPADM

## 2024-12-16 RX ORDER — CLOTRIMAZOLE AND BETAMETHASONE DIPROPIONATE 10; .64 MG/G; MG/G
CREAM TOPICAL 2 TIMES DAILY
COMMUNITY

## 2024-12-16 RX ORDER — SODIUM CHLORIDE 0.9 % (FLUSH) 0.9 %
5-40 SYRINGE (ML) INJECTION EVERY 12 HOURS SCHEDULED
Status: DISCONTINUED | OUTPATIENT
Start: 2024-12-16 | End: 2024-12-19 | Stop reason: HOSPADM

## 2024-12-16 RX ADMIN — METRONIDAZOLE 500 MG: 500 INJECTION, SOLUTION INTRAVENOUS at 15:28

## 2024-12-16 RX ADMIN — FAMOTIDINE 20 MG: 10 INJECTION, SOLUTION INTRAVENOUS at 00:57

## 2024-12-16 RX ADMIN — METRONIDAZOLE 500 MG: 500 INJECTION, SOLUTION INTRAVENOUS at 08:55

## 2024-12-16 RX ADMIN — GADOBENATE DIMEGLUMINE 15 ML: 529 INJECTION, SOLUTION INTRAVENOUS at 15:11

## 2024-12-16 RX ADMIN — ARFORMOTEROL TARTRATE: 15 SOLUTION RESPIRATORY (INHALATION) at 08:56

## 2024-12-16 RX ADMIN — ACETAMINOPHEN 650 MG: 325 TABLET ORAL at 13:17

## 2024-12-16 RX ADMIN — ARFORMOTEROL TARTRATE: 15 SOLUTION RESPIRATORY (INHALATION) at 19:37

## 2024-12-16 RX ADMIN — LEVOFLOXACIN 750 MG: 750 INJECTION, SOLUTION INTRAVENOUS at 06:50

## 2024-12-16 RX ADMIN — HYDRALAZINE HYDROCHLORIDE 20 MG: 20 INJECTION INTRAMUSCULAR; INTRAVENOUS at 23:31

## 2024-12-16 RX ADMIN — ONDANSETRON HYDROCHLORIDE 4 MG: 2 INJECTION INTRAMUSCULAR; INTRAVENOUS at 00:58

## 2024-12-16 RX ADMIN — IPRATROPIUM BROMIDE AND ALBUTEROL SULFATE 1 DOSE: .5; 2.5 SOLUTION RESPIRATORY (INHALATION) at 19:38

## 2024-12-16 RX ADMIN — METRONIDAZOLE 500 MG: 500 INJECTION, SOLUTION INTRAVENOUS at 03:28

## 2024-12-16 RX ADMIN — METRONIDAZOLE 500 MG: 500 INJECTION, SOLUTION INTRAVENOUS at 21:20

## 2024-12-16 RX ADMIN — POTASSIUM CHLORIDE 20 MEQ: 1500 TABLET, EXTENDED RELEASE ORAL at 06:42

## 2024-12-16 RX ADMIN — DICYCLOMINE HYDROCHLORIDE 20 MG: 10 CAPSULE ORAL at 00:57

## 2024-12-16 RX ADMIN — PENTOXIFYLLINE 400 MG: 400 TABLET, EXTENDED RELEASE ORAL at 20:29

## 2024-12-16 RX ADMIN — ACETAMINOPHEN 650 MG: 325 TABLET ORAL at 20:28

## 2024-12-16 RX ADMIN — PENTOXIFYLLINE 400 MG: 400 TABLET, EXTENDED RELEASE ORAL at 08:56

## 2024-12-16 RX ADMIN — SODIUM CHLORIDE: 9 INJECTION, SOLUTION INTRAVENOUS at 08:43

## 2024-12-16 RX ADMIN — MONTELUKAST 10 MG: 10 TABLET, FILM COATED ORAL at 20:29

## 2024-12-16 ASSESSMENT — PAIN SCALES - GENERAL: PAINLEVEL_OUTOF10: 5

## 2024-12-16 ASSESSMENT — PAIN DESCRIPTION - LOCATION: LOCATION: HEAD

## 2024-12-16 ASSESSMENT — PAIN DESCRIPTION - DESCRIPTORS: DESCRIPTORS: ACHING

## 2024-12-16 ASSESSMENT — PAIN DESCRIPTION - ORIENTATION: ORIENTATION: ANTERIOR

## 2024-12-16 NOTE — ED PROVIDER NOTES
[facility] EMERGENCY DEPT  EMERGENCY DEPARTMENT ENCOUNTER    Patient Name: Juana Mckeon  MRN: [id number]  YOB: 1945  Provider: Tyrone Wyatt MD  PCP: Alka Mcgee MD   Time/Date of evaluation: 11:37 PM EST on 12/15/24    History of Presenting Illness     Chief Complaint   Patient presents with    Abdominal Pain       History Provided by: Patient and Patient's    History is limited by: Nothing    HISTORY (Narative):   Juana Mckeon is a 79 y.o. female with a PMHX of hypertension  who presents to the emergency department (room 15) by POV C/O epigastric onset yesterday. Associated sxs include nausea, vomiting, radiation to the back, fever, chills. Patient denies smoking, drinking or any other sxs or complaints.     Nursing Notes were all reviewed and agreed with or any disagreements were addressed in the HPI.    Past History     PAST MEDICAL HISTORY:  History reviewed. No pertinent past medical history.    PAST SURGICAL HISTORY:  No past surgical history on file.    FAMILY HISTORY:  No family history on file.    SOCIAL HISTORY:       MEDICATIONS:  Current Facility-Administered Medications   Medication Dose Route Frequency Provider Last Rate Last Admin    sodium chloride flush 0.9 % injection 5-40 mL  5-40 mL IntraVENous 2 times per day Brendon Phillips,         sodium chloride flush 0.9 % injection 5-40 mL  5-40 mL IntraVENous PRN Brendon Phillips,         0.9 % sodium chloride infusion   IntraVENous PRN Brendon Phillips DO        enoxaparin (LOVENOX) injection 40 mg  40 mg SubCUTAneous Daily Brendon Phillips,         0.9 % sodium chloride infusion   IntraVENous Continuous Brendon Phillips DO        levoFLOXacin (LEVAQUIN) 750 MG/150ML infusion 750 mg  750 mg IntraVENous Q24H Brendon Phillips DO        potassium chloride (KLOR-CON M) extended release tablet 20 mEq  20 mEq Oral Once Brendon Phillips DO        bisacodyl (DULCOLAX) EC tablet 5 mg  5 mg Oral Daily PRN Alan  concerning for acute cholecystitis.         Electronically signed by Cm Winston          Procedures     Procedures    ED Course     11:37 PM EST I (Tyrone Wyatt MD) am the first provider for this patient. Initial assessment performed. I reviewed the vital signs, available nursing notes, past medical history, past surgical history, family history and social history. The patients presenting problems have been discussed, and they are in agreement with the care plan formulated and outlined with them.  I have encouraged them to ask questions as they arise throughout their visit.    RECORDS REVIEWED: Nursing Notes    MEDICATIONS ADMINISTERED IN THE ED:  Medications   sodium chloride flush 0.9 % injection 5-40 mL (has no administration in time range)   sodium chloride flush 0.9 % injection 5-40 mL (has no administration in time range)   0.9 % sodium chloride infusion (has no administration in time range)   enoxaparin (LOVENOX) injection 40 mg (has no administration in time range)   0.9 % sodium chloride infusion (has no administration in time range)   levoFLOXacin (LEVAQUIN) 750 MG/150ML infusion 750 mg (has no administration in time range)   potassium chloride (KLOR-CON M) extended release tablet 20 mEq (has no administration in time range)   bisacodyl (DULCOLAX) EC tablet 5 mg (has no administration in time range)   polyethylene glycol (GLYCOLAX) packet 17 g (has no administration in time range)   ondansetron (ZOFRAN) injection 4 mg (has no administration in time range)   ondansetron (ZOFRAN) injection 4 mg (4 mg IntraVENous Given 12/16/24 0058)   famotidine (PEPCID) 20 mg in sodium chloride (PF) 0.9 % 10 mL injection (20 mg IntraVENous Given 12/16/24 0057)   dicyclomine (BENTYL) capsule 20 mg (20 mg Oral Given 12/16/24 0057)   metroNIDAZOLE (FLAGYL) 500 mg in 0.9% NaCl 100 mL IVPB premix (500 mg IntraVENous New Bag 12/16/24 0328)       ED Course as of 12/16/24 0443   Mon Dec 16, 2024   0106 Informed patient of

## 2024-12-16 NOTE — ED NOTES
Attempted to call report to pt's assigned floor ([phone]). However, x2 unsuccessful attempts made after speaking with staff to hold for Nurse.

## 2024-12-16 NOTE — PROGRESS NOTES
4 Eyes Skin Assessment     NAME:  Juana Mckeon  YOB: 1945  MEDICAL RECORD NUMBER:  [id number]    The patient is being assessed for  Admission    I agree that at least one RN has performed a thorough Head to Toe Skin Assessment on the patient. ALL assessment sites listed below have been assessed.      Areas assessed by both nurses:    Head, Face, Ears, Shoulders, Back, Chest, Arms, Elbows, Hands, Sacrum. Buttock, Coccyx, Ischium, and Legs. Feet and Heels        Does the Patient have a Wound? No noted wound(s)       Beny Prevention initiated by RN: Yes  Wound Care Orders initiated by RN: No    Pressure Injury (Stage 3,4, Unstageable, DTI, NWPT, and Complex wounds) if present, place Wound referral order by RN under : No    New Ostomies, if present place, Ostomy referral order under : No     Nurse 1 eSignature: Electronically signed by Larry Torres RN on 12/16/24 at 8:07 AM EST    **SHARE this note so that the co-signing nurse can place an eSignature**    Nurse 2 eSignature: {Esignature:[id number]}

## 2024-12-16 NOTE — CARE COORDINATION
Pt discussed in IDR. Pt getting a MRCP  Pt pending general surgery to decide of pt getting gallbladder removed.     Lisa Acosta MSW, Supervisee in Social Work  Inpatient Case Mananger

## 2024-12-16 NOTE — CARE COORDINATION
MSW met with pt. Pt admitted from home for epigastric pain. Pt reported she was told she will need her gallbladder removed. Pt lives with spouse and is independent. . Pt confirmed PCP.        12/16/24 8865   Service Assessment   Patient Orientation Alert and Oriented   Cognition Alert   History Provided By Patient   Primary Caregiver Self   Accompanied By/Relationship no one   Support Systems Spouse/Significant Other   Patient's Healthcare Decision Maker is: Patient Declined (Legal Next of Kin Remains as Decision Maker)   PCP Verified by CM Yes   Last Visit to PCP Within last 3 months   Prior Functional Level Independent in ADLs/IADLs   Current Functional Level Independent in ADLs/IADLs   Can patient return to prior living arrangement Yes   Ability to make needs known: Good   Family able to assist with home care needs: Yes   Would you like for me to discuss the discharge plan with any other family members/significant others, and if so, who? No   Financial Resources Medicare   Community Resources None   Social/Functional History   Lives With Spouse   Type of Home House   Home Layout One level   Home Access Level entry   Bathroom Shower/Tub Tub/Shower unit   Bathroom Toilet Standard   Bathroom Equipment None   Bathroom Accessibility Accessible   Home Equipment Cane   Receives Help From Family   Prior Level of Assist for ADLs Independent   Prior Level of Assist for Homemaking Independent   Homemaking Responsibilities No   Ambulation Assistance Independent   Prior Level of Assist for Transfers Independent   Active  Yes   Mode of Transportation Car   Occupation Retired   Discharge Planning   Type of Residence House   Living Arrangements Spouse/Significant Other   Current Services Prior To Admission None   Potential Assistance Needed N/A   DME Ordered? No   Potential Assistance Purchasing Medications No   Type of Home Care Services None   Patient expects to be discharged to: House   Follow Up Appointment: Best  Day/Time  Thursday AM   One/Two Story Residence One story   History of falls? 1  (pt reported past hx of falls)   Services At/After Discharge   Transition of Care Consult (CM Consult) N/A   Services At/After Discharge None   [place] Resource Information Provided? No   Mode of Transport at Discharge Self   Confirm Follow Up Transport Self   Condition of Participation: Discharge Planning   The Plan for Transition of Care is related to the following treatment goals: dispo is home   The Patient and/or Patient Representative was provided with a Choice of Provider? Patient   The Patient and/Or Patient Representative agree with the Discharge Plan? Yes   Freedom of Choice list was provided with basic dialogue that supports the patient's individualized plan of care/goals, treatment preferences, and shares the quality data associated with the providers?  Yes         MARISSA Allen, Supervisee in Social Work  Inpatient Case Mananger

## 2024-12-16 NOTE — PROGRESS NOTES
Bedside and Verbal shift change report given to ANTONIO Alvarado (oncoming nurse) by ANTONIO Juarez (offgoing nurse). Report included the following information Nurse Handoff Report, Adult Overview, Surgery Report, Intake/Output, MAR, Recent Results, and Med Rec Status.

## 2024-12-16 NOTE — PROGRESS NOTES
Hospitalist Progress Note    Patient: Juana Mckeon MRN: [id number]  CSN: [id number]    YOB: 1945  Age: 79 y.o.  Sex: female    DOA: 12/15/2024 LOS:  LOS: 0 days          Chief Complain :  Abdominal Pain  79 y.o.  female w/ PMH of HTN and COPD who presents with RUQ pain   Subjective:   Patient laying in bed, pain better with pain medication.    Assessment/Plan     Active Hospital Problems    Diagnosis     Cholecystitis, acute [K81.0]     Asthma [J45.909]           Acute cholecystitis  Right upper quadrant ultrasound showed Large amount of stones/sludge within the gallbladder, with mild pericholecystic fluid and positive sonographic Hurst's sign concerning for acute cholecystitis.  Continue with IV fluids  LFTs elevated  Antibiotics: Levaquin, Flagyl.  MRCP ordered  Follow liver function  Seen by general surgery, further management per general surgery.    Asthma  No active wheezing  DuoNebs as needed    DVT Prophylaxis:  [x]Lovenox SQ  [] Heparin SQ  [] SCDs  [] Coumadin, Eliquis, Xarelto,   [] On Heparin gtt or therapeutic Lovenox. [] Patient refused.       Case discussed with:  [x]Patient  []Family [] Consultants  []Nursing  []Case Management   [] Others      Discharge to;  [x] Home  [] Home Health  [] SNF/Rehab  [] LTAC. In 2 to 3 days days    Review of systems : As above and,  General: No fevers or chills.  Cardiovascular: No chest pain or pressure. No palpitations.   Pulmonary: No shortness of breath.   Gastrointestinal: No nausea, vomiting.     Physical Exam: As above and,  General: Awake, cooperative, no acute distress    HEENT: NC, Atraumatic.  PERRLA, anicteric sclerae.  Lungs: CTA Bilaterally. No

## 2024-12-16 NOTE — CONSULTS
Consult Note    Patient: Juana Mckeon MRN: [id number]  CSN: [id number]    YOB: 1945  Age: 79 y.o.  Sex: female    DOA: 12/15/2024 LOS:  LOS: 0 days        Requesting Physician: ED  Reason for Consultation: Cholecystitis               HPI:     Juana Mckeon is a 79 y.o. female who has been seen for possible cholecystitis.  Patient presents with right upper quadrant pain epigastric pain ultrasound which is consistent with possible cholecystitis.  Bilirubin is slightly elevated and MRCP has been ordered.    History reviewed. No pertinent past medical history.    No past surgical history on file.    No family history on file.    Social History     Socioeconomic History    Marital status:      Spouse name: None    Number of children: None    Years of education: None    Highest education level: None     Social Determinants of Health     Financial Resource Strain: Low Risk  (8/11/2024)    Received from [facility]    Overall Financial Resource Strain (CARDIA)     Difficulty of Paying Living Expenses: Not hard at all   Food Insecurity: No Food Insecurity (8/11/2024)    Received from [facility]    Hunger Vital Sign     Worried About Running Out of Food in the Last Year: Never true     Ran Out of Food in the Last Year: Never true   Transportation Needs: No Transportation Needs (8/11/2024)    Received from [facility]    PRAPARE - Transportation     Lack of Transportation (Medical): No     Lack of Transportation (Non-Medical): No   Physical Activity: Insufficiently Active (7/30/2023)    Received from [facility]    Exercise Vital Sign     Days of Exercise per Week: 3 days     Minutes of Exercise per Session: 40 min   Stress: No Stress Concern Present (7/30/2023)    Received from [facility]    [employer] [place] [employer] - Occupational Stress Questionnaire     Feeling of Stress : Only a little   Social Connections: Socially  1-0.05 % cream Apply topically 2 times daily Apply topically 2 times daily.   Yes Robert Leon MD   pentoxifylline (TRENTAL) 400 MG extended release tablet Take 1 tablet by mouth in the morning and at bedtime   Yes Robert Leon MD   cycloSPORINE (RESTASIS) 0.05 % ophthalmic emulsion Place 1 drop into both eyes 3 times daily   Yes Robert Leon MD   triamcinolone (KENALOG) 0.025 % cream Apply topically every 4 hours as needed Apply Topically   Yes Robert Leon MD   triamcinolone (KENALOG) 0.1 % cream Apply topically 2 times daily as needed (1-2 times) Apply topically 2 times daily.   Yes Robert Leon MD       Allergies   Allergen Reactions    Latex Rash    Pcn [Penicillins] Hives    Egg Solids, Whole Diarrhea and Rash       Review of Systems  A comprehensive review of systems was negative except for that written in the History of Present Illness.      Physical Exam:      Visit Vitals  BP (!) 188/76   Pulse 80   Temp 99.1 °F (37.3 °C) (Oral)   Resp 16   Ht 1.6 m (5' 3\")   Wt 74.8 kg (165 lb)   SpO2 100%   BMI 29.23 kg/m²       Physical Exam:  Pertinent items are noted in HPI.    Labs Reviewed:      Assessment/Plan     Principal Problem:    Cholecystitis, acute  Resolved Problems:    * No resolved hospital problems. *      This patient was signed out by Dr. Andrews.  The patient has a MRCP ordered and we will follow-up with this.  After this is done we will reevaluate the patient for possible cholecystitis.

## 2024-12-16 NOTE — ED TRIAGE NOTES
Patient ambulatory in ED for lower abdominal pain that started yesterday. Patient states she vomited once today.

## 2024-12-16 NOTE — H&P
History & Physical    Patient: Juana Mckeon MRN: [id number]  [facility]: [id number]    YOB: 1945  Age: 79 y.o.  Sex: female      DOA: 12/15/2024    Chief Complaint:   Chief Complaint   Patient presents with    Abdominal Pain       Active Hospital Problems    Diagnosis Date Noted    Cholecystitis, acute [K81.0] 12/16/2024       HPI:     Juana Mckeon is a 79 y.o.  female w/ PMH of HTN and COPD who presents with RUQ pain.     Ms. Mckeon has had 1 day of post prandial RUQ pain and N/V since evening of 12/15. Pt denies F/C/R and last BM was on 12/15 in AM.     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 Pt denies illicit drugs, smoking and ETOH-use.     FHx: Reviewed and non-contributory.     In the ED:  Pt received initial flagyl and changed cipro IV to levaquin IV. ED d/w surgery, surgery requesting admission. Asked ED if surgery wanted a HIDA and they informed me surgery did not ask for a HIDA. Will make person NPO.       History reviewed. No pertinent past medical history.    No past surgical history on file.    No family history on file.    Social History     Socioeconomic History    Marital status:      Spouse name: None    Number of children: None    Years of education: None    Highest education level: None     Social Determinants of Health     Financial Resource Strain: Low Risk  (8/11/2024)    Received from [facility]    Overall Financial Resource Strain (CARDIA)     Difficulty of Paying Living Expenses: Not hard at all   Food Insecurity: No Food Insecurity (8/11/2024)    Received from [facility]    Hunger Vital Sign     Worried About Running Out of Food in the Last Year: Never true     Ran Out of Food in the Last Year: Never true   Transportation Needs: No Transportation Needs (8/11/2024)    Received from [facility]    PRAPARE - Transportation     Lack of Transportation (Medical): No     Lack of Transportation (Non-Medical): No  evaluation  Counseling and educating the patient/family/caregiver  Ordering medications, tests, or procedures  Referring and communicating with other health care professionals as needed  Documenting clinical information in the electronic or other health record  Independently interpreting results (not reported separately) and communicating results to the patient/family/caregiver  Care coordination and discharge planning with Case Management.        Dear patient, if you are reviewing this note and have a question regarding the medical terminology, please bring it with you to your next PCP visit.  Medical notes are meant to be a communication between medical professionals.  Additionally, portion of this note were created using voice recognition function; therefore, syntax and phonetic errors may have escaped proofreading.    Brendon Phillips DO, PhD  [facility] Medicine    12/16/2024 8:22 AM

## 2024-12-17 ENCOUNTER — APPOINTMENT (OUTPATIENT)
Facility: HOSPITAL | Age: 79
DRG: 444 | End: 2024-12-17
Payer: MEDICARE

## 2024-12-17 ENCOUNTER — APPOINTMENT (OUTPATIENT)
Facility: HOSPITAL | Age: 79
DRG: 444 | End: 2024-12-17
Attending: HOSPITALIST
Payer: MEDICARE

## 2024-12-17 PROBLEM — K81.0 ACUTE CHOLECYSTITIS: Status: ACTIVE | Noted: 2024-12-17

## 2024-12-17 LAB
ALBUMIN SERPL-MCNC: 2.5 G/DL (ref 3.4–5)
ALBUMIN SERPL-MCNC: 2.7 G/DL (ref 3.4–5)
ALBUMIN/GLOB SERPL: 0.6 (ref 0.8–1.7)
ALBUMIN/GLOB SERPL: 0.7 (ref 0.8–1.7)
ALP SERPL-CCNC: 133 U/L (ref 45–117)
ALP SERPL-CCNC: 137 U/L (ref 45–117)
ALT SERPL-CCNC: 122 U/L (ref 13–56)
ALT SERPL-CCNC: 98 U/L (ref 13–56)
ANION GAP SERPL CALC-SCNC: 5 MMOL/L (ref 3–18)
ANION GAP SERPL CALC-SCNC: 6 MMOL/L (ref 3–18)
APTT PPP: 27.5 SEC (ref 23–36.4)
APTT PPP: 57.5 SEC (ref 23–36.4)
APTT PPP: 79.4 SEC (ref 23–36.4)
AST SERPL-CCNC: 55 U/L (ref 10–38)
AST SERPL-CCNC: 79 U/L (ref 10–38)
BILIRUB SERPL-MCNC: 3.9 MG/DL (ref 0.2–1)
BILIRUB SERPL-MCNC: 4.1 MG/DL (ref 0.2–1)
BUN SERPL-MCNC: 10 MG/DL (ref 7–18)
BUN SERPL-MCNC: 7 MG/DL (ref 7–18)
BUN/CREAT SERPL: 12 (ref 12–20)
BUN/CREAT SERPL: 13 (ref 12–20)
CALCIUM SERPL-MCNC: 8.8 MG/DL (ref 8.5–10.1)
CALCIUM SERPL-MCNC: 9.1 MG/DL (ref 8.5–10.1)
CHLORIDE SERPL-SCNC: 106 MMOL/L (ref 100–111)
CHLORIDE SERPL-SCNC: 106 MMOL/L (ref 100–111)
CO2 SERPL-SCNC: 23 MMOL/L (ref 21–32)
CO2 SERPL-SCNC: 24 MMOL/L (ref 21–32)
CREAT SERPL-MCNC: 0.6 MG/DL (ref 0.6–1.3)
CREAT SERPL-MCNC: 0.75 MG/DL (ref 0.6–1.3)
ECHO AO ROOT DIAM: 2.4 CM
ECHO AO ROOT INDEX: 1.35 CM/M2
ECHO AV AREA PEAK VELOCITY: 2.3 CM2
ECHO AV AREA VTI: 1.9 CM2
ECHO AV AREA/BSA PEAK VELOCITY: 1.3 CM2/M2
ECHO AV AREA/BSA VTI: 1.1 CM2/M2
ECHO AV MEAN GRADIENT: 6 MMHG
ECHO AV MEAN VELOCITY: 1.2 M/S
ECHO AV PEAK GRADIENT: 11 MMHG
ECHO AV PEAK VELOCITY: 1.6 M/S
ECHO AV VELOCITY RATIO: 0.81
ECHO AV VTI: 34.7 CM
ECHO BSA: 1.82 M2
ECHO EST RA PRESSURE: 3 MMHG
ECHO LA DIAMETER INDEX: 1.85 CM/M2
ECHO LA DIAMETER: 3.3 CM
ECHO LA TO AORTIC ROOT RATIO: 1.38
ECHO LA VOL A-L A2C: 33 ML (ref 22–52)
ECHO LA VOL MOD A2C: 31 ML (ref 22–52)
ECHO LA VOLUME INDEX A-L A2C: 19 ML/M2 (ref 16–34)
ECHO LA VOLUME INDEX MOD A2C: 17 ML/M2 (ref 16–34)
ECHO LV E' LATERAL VELOCITY: 10.09 CM/S
ECHO LV E' SEPTAL VELOCITY: 8.11 CM/S
ECHO LV EDV A2C: 43 ML
ECHO LV EDV A4C: 42 ML
ECHO LV EDV BP: 43 ML (ref 56–104)
ECHO LV EDV INDEX A4C: 24 ML/M2
ECHO LV EDV INDEX BP: 24 ML/M2
ECHO LV EDV NDEX A2C: 24 ML/M2
ECHO LV EJECTION FRACTION A2C: 67 %
ECHO LV EJECTION FRACTION A4C: 56 %
ECHO LV EJECTION FRACTION BIPLANE: 62 % (ref 55–100)
ECHO LV ESV A2C: 14 ML
ECHO LV ESV A4C: 19 ML
ECHO LV ESV BP: 16 ML (ref 19–49)
ECHO LV ESV INDEX A2C: 8 ML/M2
ECHO LV ESV INDEX A4C: 11 ML/M2
ECHO LV ESV INDEX BP: 9 ML/M2
ECHO LV FRACTIONAL SHORTENING: 33 % (ref 28–44)
ECHO LV INTERNAL DIMENSION DIASTOLE INDEX: 2.42 CM/M2
ECHO LV INTERNAL DIMENSION DIASTOLIC: 4.3 CM (ref 3.9–5.3)
ECHO LV INTERNAL DIMENSION SYSTOLIC INDEX: 1.63 CM/M2
ECHO LV INTERNAL DIMENSION SYSTOLIC: 2.9 CM
ECHO LV IVSD: 0.7 CM (ref 0.6–0.9)
ECHO LV MASS 2D: 88.5 G (ref 67–162)
ECHO LV MASS INDEX 2D: 49.7 G/M2 (ref 43–95)
ECHO LV POSTERIOR WALL DIASTOLIC: 0.7 CM (ref 0.6–0.9)
ECHO LV RELATIVE WALL THICKNESS RATIO: 0.33
ECHO LVOT AREA: 3.1 CM2
ECHO LVOT AV VTI INDEX: 0.65
ECHO LVOT DIAM: 2 CM
ECHO LVOT MEAN GRADIENT: 3 MMHG
ECHO LVOT PEAK GRADIENT: 6 MMHG
ECHO LVOT PEAK VELOCITY: 1.3 M/S
ECHO LVOT STROKE VOLUME INDEX: 39.7 ML/M2
ECHO LVOT SV: 70.7 ML
ECHO LVOT VTI: 22.5 CM
ECHO MV A VELOCITY: 0.97 M/S
ECHO MV E DECELERATION TIME (DT): 148.2 MS
ECHO MV E VELOCITY: 1.03 M/S
ECHO MV E/A RATIO: 1.06
ECHO MV E/E' LATERAL: 10.21
ECHO MV E/E' RATIO (AVERAGED): 11.45
ECHO MV E/E' SEPTAL: 12.7
ECHO RA END SYSTOLIC VOLUME APICAL 4 CHAMBER INDEX BSA: 18 ML/M2
ECHO RA VOLUME: 32 ML
ECHO RIGHT VENTRICULAR SYSTOLIC PRESSURE (RVSP): 30 MMHG
ECHO RV FREE WALL PEAK S': 13 CM/S
ECHO RV INTERNAL DIMENSION: 2.6 CM
ECHO RV TAPSE: 2.1 CM (ref 1.7–?)
ECHO TV REGURGITANT MAX VELOCITY: 2.62 M/S
ECHO TV REGURGITANT PEAK GRADIENT: 27 MMHG
EKG ATRIAL RATE: 178 BPM
EKG DIAGNOSIS: NORMAL
EKG Q-T INTERVAL: 258 MS
EKG QRS DURATION: 82 MS
EKG QTC CALCULATION (BAZETT): 452 MS
EKG R AXIS: 65 DEGREES
EKG T AXIS: 1 DEGREES
EKG VENTRICULAR RATE: 185 BPM
ERYTHROCYTE [DISTWIDTH] IN BLOOD BY AUTOMATED COUNT: 13.3 % (ref 11.6–14.5)
ERYTHROCYTE [DISTWIDTH] IN BLOOD BY AUTOMATED COUNT: 13.6 % (ref 11.6–14.5)
GLOBULIN SER CALC-MCNC: 4 G/DL (ref 2–4)
GLOBULIN SER CALC-MCNC: 4 G/DL (ref 2–4)
GLUCOSE BLD STRIP.AUTO-MCNC: 101 MG/DL (ref 70–110)
GLUCOSE BLD STRIP.AUTO-MCNC: 101 MG/DL (ref 70–110)
GLUCOSE BLD STRIP.AUTO-MCNC: 108 MG/DL (ref 70–110)
GLUCOSE BLD STRIP.AUTO-MCNC: 115 MG/DL (ref 70–110)
GLUCOSE SERPL-MCNC: 111 MG/DL (ref 74–99)
GLUCOSE SERPL-MCNC: 118 MG/DL (ref 74–99)
HCT VFR BLD AUTO: 36.2 % (ref 35–45)
HCT VFR BLD AUTO: 37.7 % (ref 35–45)
HGB BLD-MCNC: 12.6 G/DL (ref 12–16)
HGB BLD-MCNC: 13 G/DL (ref 12–16)
LACTATE SERPL-SCNC: 1.1 MMOL/L (ref 0.4–2)
MAGNESIUM SERPL-MCNC: 1.7 MG/DL (ref 1.6–2.6)
MCH RBC QN AUTO: 31.9 PG (ref 24–34)
MCH RBC QN AUTO: 32.1 PG (ref 24–34)
MCHC RBC AUTO-ENTMCNC: 34.5 G/DL (ref 31–37)
MCHC RBC AUTO-ENTMCNC: 34.8 G/DL (ref 31–37)
MCV RBC AUTO: 92.1 FL (ref 78–100)
MCV RBC AUTO: 92.4 FL (ref 78–100)
NRBC # BLD: 0 K/UL (ref 0–0.01)
NRBC # BLD: 0 K/UL (ref 0–0.01)
NRBC BLD-RTO: 0 PER 100 WBC
NRBC BLD-RTO: 0 PER 100 WBC
PLATELET # BLD AUTO: 132 K/UL (ref 135–420)
PLATELET # BLD AUTO: 141 K/UL (ref 135–420)
PMV BLD AUTO: 10 FL (ref 9.2–11.8)
PMV BLD AUTO: 10.2 FL (ref 9.2–11.8)
POTASSIUM SERPL-SCNC: 3.6 MMOL/L (ref 3.5–5.5)
POTASSIUM SERPL-SCNC: 3.6 MMOL/L (ref 3.5–5.5)
PROT SERPL-MCNC: 6.5 G/DL (ref 6.4–8.2)
PROT SERPL-MCNC: 6.7 G/DL (ref 6.4–8.2)
RBC # BLD AUTO: 3.93 M/UL (ref 4.2–5.3)
RBC # BLD AUTO: 4.08 M/UL (ref 4.2–5.3)
SODIUM SERPL-SCNC: 135 MMOL/L (ref 136–145)
SODIUM SERPL-SCNC: 135 MMOL/L (ref 136–145)
TROPONIN I SERPL HS-MCNC: 51 NG/L (ref 0–54)
TROPONIN I SERPL HS-MCNC: 583 NG/L (ref 0–54)
TROPONIN I SERPL HS-MCNC: 888 NG/L (ref 0–54)
TSH SERPL DL<=0.05 MIU/L-ACNC: 2.02 UIU/ML (ref 0.36–3.74)
WBC # BLD AUTO: 13 K/UL (ref 4.6–13.2)
WBC # BLD AUTO: 14.7 K/UL (ref 4.6–13.2)

## 2024-12-17 PROCEDURE — 93306 TTE W/DOPPLER COMPLETE: CPT

## 2024-12-17 PROCEDURE — 36415 COLL VENOUS BLD VENIPUNCTURE: CPT

## 2024-12-17 PROCEDURE — 6370000000 HC RX 637 (ALT 250 FOR IP): Performed by: INTERNAL MEDICINE

## 2024-12-17 PROCEDURE — 2500000003 HC RX 250 WO HCPCS: Performed by: HOSPITALIST

## 2024-12-17 PROCEDURE — 2580000003 HC RX 258: Performed by: INTERNAL MEDICINE

## 2024-12-17 PROCEDURE — G0378 HOSPITAL OBSERVATION PER HR: HCPCS

## 2024-12-17 PROCEDURE — 96366 THER/PROPH/DIAG IV INF ADDON: CPT

## 2024-12-17 PROCEDURE — 96367 TX/PROPH/DG ADDL SEQ IV INF: CPT

## 2024-12-17 PROCEDURE — 83735 ASSAY OF MAGNESIUM: CPT

## 2024-12-17 PROCEDURE — 84443 ASSAY THYROID STIM HORMONE: CPT

## 2024-12-17 PROCEDURE — 2580000003 HC RX 258: Performed by: HOSPITALIST

## 2024-12-17 PROCEDURE — 1100000003 HC PRIVATE W/ TELEMETRY

## 2024-12-17 PROCEDURE — 94640 AIRWAY INHALATION TREATMENT: CPT

## 2024-12-17 PROCEDURE — 6360000002 HC RX W HCPCS: Performed by: HOSPITALIST

## 2024-12-17 PROCEDURE — 82962 GLUCOSE BLOOD TEST: CPT

## 2024-12-17 PROCEDURE — 85730 THROMBOPLASTIN TIME PARTIAL: CPT

## 2024-12-17 PROCEDURE — 84484 ASSAY OF TROPONIN QUANT: CPT

## 2024-12-17 PROCEDURE — 96375 TX/PRO/DX INJ NEW DRUG ADDON: CPT

## 2024-12-17 PROCEDURE — 93005 ELECTROCARDIOGRAM TRACING: CPT | Performed by: HOSPITALIST

## 2024-12-17 PROCEDURE — 96361 HYDRATE IV INFUSION ADD-ON: CPT

## 2024-12-17 PROCEDURE — 85027 COMPLETE CBC AUTOMATED: CPT

## 2024-12-17 PROCEDURE — 6360000002 HC RX W HCPCS: Performed by: INTERNAL MEDICINE

## 2024-12-17 PROCEDURE — 74176 CT ABD & PELVIS W/O CONTRAST: CPT

## 2024-12-17 PROCEDURE — 96368 THER/DIAG CONCURRENT INF: CPT

## 2024-12-17 PROCEDURE — 83605 ASSAY OF LACTIC ACID: CPT

## 2024-12-17 PROCEDURE — 80053 COMPREHEN METABOLIC PANEL: CPT

## 2024-12-17 PROCEDURE — 6370000000 HC RX 637 (ALT 250 FOR IP): Performed by: HOSPITALIST

## 2024-12-17 RX ORDER — HEPARIN SODIUM 1000 [USP'U]/ML
4000 INJECTION, SOLUTION INTRAVENOUS; SUBCUTANEOUS ONCE
Status: COMPLETED | OUTPATIENT
Start: 2024-12-17 | End: 2024-12-17

## 2024-12-17 RX ORDER — SODIUM CHLORIDE 9 MG/ML
INJECTION, SOLUTION INTRAVENOUS CONTINUOUS
Status: DISCONTINUED | OUTPATIENT
Start: 2024-12-17 | End: 2024-12-19 | Stop reason: HOSPADM

## 2024-12-17 RX ORDER — 0.9 % SODIUM CHLORIDE 0.9 %
500 INTRAVENOUS SOLUTION INTRAVENOUS ONCE
Status: COMPLETED | OUTPATIENT
Start: 2024-12-17 | End: 2024-12-17

## 2024-12-17 RX ORDER — DILTIAZEM HYDROCHLORIDE 5 MG/ML
20 INJECTION INTRAVENOUS
Status: COMPLETED | OUTPATIENT
Start: 2024-12-17 | End: 2024-12-17

## 2024-12-17 RX ORDER — METOPROLOL TARTRATE 1 MG/ML
2.5 INJECTION, SOLUTION INTRAVENOUS EVERY 6 HOURS
Status: DISCONTINUED | OUTPATIENT
Start: 2024-12-17 | End: 2024-12-18

## 2024-12-17 RX ORDER — HEPARIN SODIUM 1000 [USP'U]/ML
4000 INJECTION, SOLUTION INTRAVENOUS; SUBCUTANEOUS PRN
Status: DISCONTINUED | OUTPATIENT
Start: 2024-12-17 | End: 2024-12-19

## 2024-12-17 RX ORDER — HEPARIN SODIUM 10000 [USP'U]/100ML
5-30 INJECTION, SOLUTION INTRAVENOUS CONTINUOUS
Status: DISCONTINUED | OUTPATIENT
Start: 2024-12-17 | End: 2024-12-19

## 2024-12-17 RX ORDER — HEPARIN SODIUM 1000 [USP'U]/ML
2000 INJECTION, SOLUTION INTRAVENOUS; SUBCUTANEOUS PRN
Status: DISCONTINUED | OUTPATIENT
Start: 2024-12-17 | End: 2024-12-19

## 2024-12-17 RX ORDER — METOPROLOL TARTRATE 1 MG/ML
5 INJECTION, SOLUTION INTRAVENOUS EVERY 6 HOURS
Status: DISCONTINUED | OUTPATIENT
Start: 2024-12-17 | End: 2024-12-17

## 2024-12-17 RX ORDER — DILTIAZEM HYDROCHLORIDE 5 MG/ML
INJECTION INTRAVENOUS
Status: DISCONTINUED
Start: 2024-12-17 | End: 2024-12-17 | Stop reason: WASHOUT

## 2024-12-17 RX ORDER — METOPROLOL TARTRATE 1 MG/ML
5 INJECTION, SOLUTION INTRAVENOUS ONCE
Status: COMPLETED | OUTPATIENT
Start: 2024-12-17 | End: 2024-12-17

## 2024-12-17 RX ORDER — IPRATROPIUM BROMIDE AND ALBUTEROL SULFATE 2.5; .5 MG/3ML; MG/3ML
1 SOLUTION RESPIRATORY (INHALATION) EVERY 4 HOURS PRN
Status: DISCONTINUED | OUTPATIENT
Start: 2024-12-17 | End: 2024-12-19 | Stop reason: HOSPADM

## 2024-12-17 RX ADMIN — SODIUM CHLORIDE: 9 INJECTION, SOLUTION INTRAVENOUS at 22:35

## 2024-12-17 RX ADMIN — METRONIDAZOLE 500 MG: 500 INJECTION, SOLUTION INTRAVENOUS at 22:36

## 2024-12-17 RX ADMIN — DILTIAZEM HYDROCHLORIDE 20 MG: 5 INJECTION, SOLUTION INTRAVENOUS at 10:16

## 2024-12-17 RX ADMIN — MONTELUKAST 10 MG: 10 TABLET, FILM COATED ORAL at 20:31

## 2024-12-17 RX ADMIN — ARFORMOTEROL TARTRATE: 15 SOLUTION RESPIRATORY (INHALATION) at 07:20

## 2024-12-17 RX ADMIN — ARFORMOTEROL TARTRATE: 15 SOLUTION RESPIRATORY (INHALATION) at 19:23

## 2024-12-17 RX ADMIN — DILTIAZEM HYDROCHLORIDE 5 MG/HR: 5 INJECTION, SOLUTION INTRAVENOUS at 11:05

## 2024-12-17 RX ADMIN — IPRATROPIUM BROMIDE AND ALBUTEROL SULFATE 1 DOSE: .5; 2.5 SOLUTION RESPIRATORY (INHALATION) at 07:20

## 2024-12-17 RX ADMIN — HEPARIN SODIUM 4000 UNITS: 1000 INJECTION INTRAVENOUS; SUBCUTANEOUS at 12:08

## 2024-12-17 RX ADMIN — SODIUM CHLORIDE 500 ML: 9 INJECTION, SOLUTION INTRAVENOUS at 14:54

## 2024-12-17 RX ADMIN — SODIUM CHLORIDE: 9 INJECTION, SOLUTION INTRAVENOUS at 04:01

## 2024-12-17 RX ADMIN — LEVOFLOXACIN 750 MG: 750 INJECTION, SOLUTION INTRAVENOUS at 05:12

## 2024-12-17 RX ADMIN — METOPROLOL TARTRATE 5 MG: 5 INJECTION INTRAVENOUS at 12:27

## 2024-12-17 RX ADMIN — METRONIDAZOLE 500 MG: 500 INJECTION, SOLUTION INTRAVENOUS at 06:51

## 2024-12-17 RX ADMIN — HEPARIN SODIUM 12 UNITS/KG/HR: 10000 INJECTION, SOLUTION INTRAVENOUS at 12:08

## 2024-12-17 RX ADMIN — METRONIDAZOLE 500 MG: 500 INJECTION, SOLUTION INTRAVENOUS at 15:35

## 2024-12-17 RX ADMIN — PENTOXIFYLLINE 400 MG: 400 TABLET, EXTENDED RELEASE ORAL at 09:16

## 2024-12-17 RX ADMIN — SODIUM CHLORIDE: 9 INJECTION, SOLUTION INTRAVENOUS at 16:59

## 2024-12-17 NOTE — PROGRESS NOTES
22:55 Shift assessment completed. See nsg flow sheet for details.    03:05 Reassessed with 0 changes noted. Resting quietly in bed with eyes closed between cares x for voiding per BR w/o difficulty,    07:20 Bedside and Verbal shift change report given to ANNABELLE aHnson RN (oncoming nurse) by YOLANDA Huntley RN (offgoing nurse). Report included the following information Nurse Handoff Report.

## 2024-12-17 NOTE — PROGRESS NOTES
recommended stat CT abdomen pelvis which showed similar findings.  Follow liver function  Plan for surgery tomorrow    Asthma  No active wheezing  DuoNebs as needed    DVT Prophylaxis:  [x]Lovenox SQ  [] Heparin SQ  [] SCDs  [] Coumadin, Eliquis, Xarelto,   [] On Heparin gtt or therapeutic Lovenox. [] Patient refused.    [id number]  65 minutes of critical care time spent in the direct evaluation and treatment of this high risk patient. The reason for providing this level of medical care for this critically ill patient was due a critical illness that impaired one or more vital organ systems such that there was a high probability of imminent or life threatening deterioration in the patients condition. This care involved high complexity decision making to assess, manipulate, and support vital system functions, discuss with specialists to treat this degreee vital organ system failure and to prevent further life threatening deterioration of the patient’s condition. This time does not include any procedures.          Case discussed with:  [x]Patient  [x]Family [x] Consultants  [x]Nursing  []Case Management   [] Others      Discharge to;  [x] Home  [] Home Health  [] SNF/Rehab  [] LTAC. In 2 to 3 days days    Review of systems : As above and,  General: No fevers or chills.  Cardiovascular: No chest pain or pressure. No palpitations.   Pulmonary: No shortness of breath.   Gastrointestinal: No nausea, vomiting.     Physical Exam: As above and,  General: Awake, cooperative, no acute distress    HEENT: NC, Atraumatic.  PERRLA, anicteric sclerae.  Lungs: CTA Bilaterally. No Wheezing/Rhonchi/Rales.  Heart:  S1 S2,  No murmur, No Rubs, No Gallops  Abdomen: Soft, Non distended, right upper quadrant tender.  +Bowel sounds,   Extremities: No c/c/e  Psych:   Not anxious or agitated.  Neurologic:  No acute neurological deficit.         Vital signs/Intake and Output:  Visit Vitals  BP (!) 103/55   Pulse 80   Temp 98.4 °F (36.9 °C)  health care professionals as needed  Documenting clinical information in the electronic or other health record  Independently interpreting results (not reported separately) and communicating results to the patient/family/caregiver  Care coordination and discharge planning with Case Management.    Dear patient or family member or Caretaker, if you are reviewing this note and have a question regarding the medical terminology, please bring it with you to your next PCP visit.  Medical notes are meant to be a communication between medical professionals.  Additionally, portion of this note were created using voice recognition function, syntax and phonetic over may have escaped proofreading.

## 2024-12-17 NOTE — PROGRESS NOTES
Bedside shift change report given to Pushpa RN (oncoming nurse) by Christiano RN (offgoing nurse). Report included the following information Nurse Handoff Report, Adult Overview, Intake/Output, MAR, Recent Results, and Med Rec Status.

## 2024-12-17 NOTE — CONSULTS
[facility] Consult Note      Patient: Juana Mckeon MRN: [id number]  SSN: [id number]    YOB: 1945  Age: 79 y.o.  Sex: female    Date of Consultation: 12/17/2024  Referring Physician: Ilir Henderson MD  Reason for Consultation: Atrial fibrillation with RVR    Chief complain: Abdominal  Pain    HPI:  79-year-old female came to emergency of abdominal pain, nausea or vomiting.  She is being managed for acute cholecystitis.  Patient developed palpitation.  Monitor revealed atrial fibrillation with rapid ventricular rate.  She was started on IV Cardizem drip.  She denies any similar episode in the past.  Currently she denies any palpitation.  Denies dizziness, presyncope or syncope.  Denies chest pain stenosis exertion.  Denies any orthopnea or PND.  Denies any smoking or alcohol abuse.  Cardiology consult called for evaluation of newly diagnosed atrial fibrillation    Past Medical History:   Diagnosis Date    Asthma     Osteoarthritis of knee     Venous insufficiency of both lower extremities      No past surgical history on file.  Current Facility-Administered Medications   Medication Dose Route Frequency    dilTIAZem 125 mg in sodium chloride 0.9 % 125 mL infusion  5 mg/hr IntraVENous Continuous    heparin (porcine) injection 4,000 Units  4,000 Units IntraVENous PRN    heparin (porcine) injection 2,000 Units  2,000 Units IntraVENous PRN    heparin 25,000 units in dextrose 5% 250 mL (premix) infusion  5-30 Units/kg/hr IntraVENous Continuous    ipratropium 0.5 mg-albuterol 2.5 mg (DUONEB) nebulizer solution 1 Dose  1 Dose Inhalation Q4H PRN    0.9 % sodium chloride infusion   IntraVENous Continuous    sodium chloride 0.9 % bolus 500 mL  500 mL IntraVENous Once    sodium chloride flush 0.9 % injection 5-40 mL  5-40 mL IntraVENous 2 times per day    sodium chloride flush 0.9 % injection 5-40 mL  5-40 mL IntraVENous PRN    0.9 % sodium chloride infusion   IntraVENous PRN    levoFLOXacin (LEVAQUIN) 750 MG/150ML  No rash, new skin lesion or pruritis.   Musc/skeletal: no bone or joint complains.  Vasc: No edema, cyanosis or claudication.   Endo: No heat/cold intolerance, no polyuria,polydipsia or polyphagia.   Neuro: No unilateral weakness, numbness, tingling. No seizures.   Heme: No easy bruising or bleeding.      Physical:   Patient Vitals for the past 6 hrs:   Temp Pulse Resp BP SpO2   12/17/24 1357 -- 95 -- 107/69 --   12/17/24 1150 98.8 °F (37.1 °C) 95 18 107/69 95 %         Exam:   General Appearance: Comfortable, not using accessory muscles of respiration.  HEENT: BILL.   HEAD: Atraumatic  NECK: No JVD, no thyroidomeglay. CAROTIDS:  no bruit  LUNGS: Clear bilaterally.   HEART: S1+S2 audible, no murmur, no pericardial rub.     ABD: Non-tender, BS Audible    EXT: No edema, and no cyanosis.  VASCULAR EXAM: Pulses are intact.    PSYCHIATRIC EXAM: Mood is appropriate.  MUSCULOSKELETAL: Grossly no joint deformity.  NEUROLOGICAL: AAO times 3, Motor and sensory sytem intact     Review of Data:   LABS:   Lab Results   Component Value Date/Time    WBC 14.7 12/17/2024 02:45 PM    HGB 12.6 12/17/2024 02:45 PM    HCT 36.2 12/17/2024 02:45 PM     12/17/2024 02:45 PM     Lab Results   Component Value Date/Time     12/17/2024 02:45 PM    K 3.6 12/17/2024 02:45 PM     12/17/2024 02:45 PM    CO2 23 12/17/2024 02:45 PM    BUN 10 12/17/2024 02:45 PM     No results found for: \"CHOL\", \"CHLST\", \"CHOLV\", \"HDL\", \"HDLC\", \"LDL\", \"LDLC\"  No components found for: \"GPT\"  Hemoglobin A1C   Date Value Ref Range Status   12/16/2024 5.4 4.2 - 5.6 % Final     Comment:     (NOTE)  HbA1C Interpretive Ranges  <5.7              Normal  5.7 - 6.4         Consider Prediabetes  >6.5              Consider Diabetes           Cardiology Procedures:  poor baseline, atrial fibrillation with rapid ventricular rate ST-T changes in lateral leads        Impression / Plan:    Patient Active Problem List   Diagnosis    Cholecystitis, acute    Asthma

## 2024-12-17 NOTE — PROGRESS NOTES
The patient went into A-fib today.  She is currently being worked up.  She is currently on heparin and cardiology consult is pending.  This will delay her surgery.  Her procedure will be canceled tomorrow.

## 2024-12-17 NOTE — PROGRESS NOTES
The patient's MRI is pending.  She feels well otherwise and has minimal pain on the right side.  Her bilirubin is up to the 4 range unfortunately.  I will wait for her MRI to come back hopefully this is just a result of cholecystitis.  I will tentatively place her on the schedule for laparoscopic cholecystectomy for tomorrow.  However if the procedure cannot be done until later in the afternoon or evening then this will be delayed.  I have discussed the plan with the patient.

## 2024-12-17 NOTE — PROGRESS NOTES
0935   Patient states \"I feel my heart's palpitations.\" Vitals checked, pulse intermittently shoots up to the 170s and then drops right back to the 90s. Patient on continuous pulse monitoring. Dr. Hazel notified. Observation continues.    0957  Rapid called. Provider at bedside, RRT in the room.     1000  STAT EKG done per order.     1016  Cardizem 20 MG given per order. Patient transferred to [address] [place] [address] per order.    1020  Verbal report given to Thania ALVAREZ on Juana Mckeon  being transferred to [address] [place] [address] for urgent transfer. Report consisted of patient's Situation, Background, Assessment and   Recommendations(SBAR). Information from the following report(s) Nurse Handoff Report, Index, Adult Overview, Intake/Output, and MAR was reviewed with the receiving nurse.Opportunity for questions and clarification was provided.    Patient transported with:  Registered Nurse

## 2024-12-17 NOTE — PROGRESS NOTES
Called by hospitalist earlier this morning because of the patient's heart rate going up to 170s.    MRCP which was ordered yesterday and done yesterday was not read till this morning and apparently the radiologist called the hospitalist and informed them that the patient had a gastric volvulus.  This appears to be chronic and on CT scan does not show to be obstructing.  I do not think that this is her primary problem or related.  She does have cholecystitis and no evidence of bile duct obstruction.  I would be concerned about sepsis or cardiac source of her tachycardia.  The patient will need to be cleared for laparoscopic cholecystectomy tomorrow.  If she cannot have surgery then a cholecystostomy tube may be preferable.

## 2024-12-17 NOTE — PLAN OF CARE
Problem: Pain  Goal: Verbalizes/displays adequate comfort level or baseline comfort level  12/17/2024 1435 by Thania Benedict, RN  Outcome: Progressing  12/17/2024 0412 by Pushpa Huntley, RN  Outcome: Progressing  12/17/2024 0412 by Pushpa Huntley, RN  Outcome: Progressing     Problem: Discharge Planning  Goal: Discharge to home or other facility with appropriate resources  Outcome: Progressing

## 2024-12-17 NOTE — PLAN OF CARE
Problem: Pain  Goal: Verbalizes/displays adequate comfort level or baseline comfort level  12/17/2024 0412 by Pushpa Huntley, RN  Outcome: Progressing  12/16/2024 1552 by Christiano Mobley, RN  Outcome: Progressing

## 2024-12-18 LAB
ALBUMIN SERPL-MCNC: 2.4 G/DL (ref 3.4–5)
ALBUMIN/GLOB SERPL: 0.8 (ref 0.8–1.7)
ALP SERPL-CCNC: 122 U/L (ref 45–117)
ALT SERPL-CCNC: 71 U/L (ref 13–56)
ANION GAP SERPL CALC-SCNC: 6 MMOL/L (ref 3–18)
APTT PPP: 39 SEC (ref 23–36.4)
APTT PPP: 40.6 SEC (ref 23–36.4)
APTT PPP: >180 SEC (ref 23–36.4)
AST SERPL-CCNC: 33 U/L (ref 10–38)
BILIRUB DIRECT SERPL-MCNC: 1.3 MG/DL (ref 0–0.2)
BILIRUB INDIRECT SERPL-MCNC: 0.7 MG/DL
BILIRUB SERPL-MCNC: 1.7 MG/DL (ref 0.2–1)
BILIRUB SERPL-MCNC: 2 MG/DL (ref 0.2–1)
BUN SERPL-MCNC: 9 MG/DL (ref 7–18)
BUN/CREAT SERPL: 15 (ref 12–20)
CALCIUM SERPL-MCNC: 8.3 MG/DL (ref 8.5–10.1)
CHLORIDE SERPL-SCNC: 109 MMOL/L (ref 100–111)
CO2 SERPL-SCNC: 22 MMOL/L (ref 21–32)
CREAT SERPL-MCNC: 0.62 MG/DL (ref 0.6–1.3)
ERYTHROCYTE [DISTWIDTH] IN BLOOD BY AUTOMATED COUNT: 13.7 % (ref 11.6–14.5)
GLOBULIN SER CALC-MCNC: 3.2 G/DL (ref 2–4)
GLUCOSE BLD STRIP.AUTO-MCNC: 100 MG/DL (ref 70–110)
GLUCOSE BLD STRIP.AUTO-MCNC: 101 MG/DL (ref 70–110)
GLUCOSE BLD STRIP.AUTO-MCNC: 99 MG/DL (ref 70–110)
GLUCOSE SERPL-MCNC: 110 MG/DL (ref 74–99)
HCT VFR BLD AUTO: 33.1 % (ref 35–45)
HGB BLD-MCNC: 11 G/DL (ref 12–16)
MAGNESIUM SERPL-MCNC: 1.8 MG/DL (ref 1.6–2.6)
MCH RBC QN AUTO: 31.2 PG (ref 24–34)
MCHC RBC AUTO-ENTMCNC: 33.2 G/DL (ref 31–37)
MCV RBC AUTO: 93.8 FL (ref 78–100)
NRBC # BLD: 0 K/UL (ref 0–0.01)
NRBC BLD-RTO: 0 PER 100 WBC
PLATELET # BLD AUTO: 143 K/UL (ref 135–420)
PMV BLD AUTO: 10.1 FL (ref 9.2–11.8)
POTASSIUM SERPL-SCNC: 3.4 MMOL/L (ref 3.5–5.5)
PROT SERPL-MCNC: 5.6 G/DL (ref 6.4–8.2)
RBC # BLD AUTO: 3.53 M/UL (ref 4.2–5.3)
SODIUM SERPL-SCNC: 137 MMOL/L (ref 136–145)
TROPONIN I SERPL HS-MCNC: 327 NG/L (ref 0–54)
TROPONIN I SERPL HS-MCNC: 369 NG/L (ref 0–54)
TROPONIN I SERPL HS-MCNC: 536 NG/L (ref 0–54)
TROPONIN I SERPL HS-MCNC: 719 NG/L (ref 0–54)
WBC # BLD AUTO: 11.2 K/UL (ref 4.6–13.2)

## 2024-12-18 PROCEDURE — 85730 THROMBOPLASTIN TIME PARTIAL: CPT

## 2024-12-18 PROCEDURE — 84484 ASSAY OF TROPONIN QUANT: CPT

## 2024-12-18 PROCEDURE — 2500000003 HC RX 250 WO HCPCS: Performed by: HOSPITALIST

## 2024-12-18 PROCEDURE — 6360000002 HC RX W HCPCS: Performed by: HOSPITALIST

## 2024-12-18 PROCEDURE — 36415 COLL VENOUS BLD VENIPUNCTURE: CPT

## 2024-12-18 PROCEDURE — 82247 BILIRUBIN TOTAL: CPT

## 2024-12-18 PROCEDURE — 6370000000 HC RX 637 (ALT 250 FOR IP): Performed by: HOSPITALIST

## 2024-12-18 PROCEDURE — 83735 ASSAY OF MAGNESIUM: CPT

## 2024-12-18 PROCEDURE — 2500000003 HC RX 250 WO HCPCS: Performed by: INTERNAL MEDICINE

## 2024-12-18 PROCEDURE — 6370000000 HC RX 637 (ALT 250 FOR IP): Performed by: INTERNAL MEDICINE

## 2024-12-18 PROCEDURE — 6360000002 HC RX W HCPCS: Performed by: INTERNAL MEDICINE

## 2024-12-18 PROCEDURE — 1100000003 HC PRIVATE W/ TELEMETRY

## 2024-12-18 PROCEDURE — 85027 COMPLETE CBC AUTOMATED: CPT

## 2024-12-18 PROCEDURE — 94640 AIRWAY INHALATION TREATMENT: CPT

## 2024-12-18 PROCEDURE — 82962 GLUCOSE BLOOD TEST: CPT

## 2024-12-18 PROCEDURE — 82248 BILIRUBIN DIRECT: CPT

## 2024-12-18 PROCEDURE — 80053 COMPREHEN METABOLIC PANEL: CPT

## 2024-12-18 RX ORDER — POTASSIUM CHLORIDE 1500 MG/1
40 TABLET, EXTENDED RELEASE ORAL ONCE
Status: COMPLETED | OUTPATIENT
Start: 2024-12-18 | End: 2024-12-18

## 2024-12-18 RX ORDER — METOPROLOL TARTRATE 25 MG/1
12.5 TABLET, FILM COATED ORAL 2 TIMES DAILY
Status: DISCONTINUED | OUTPATIENT
Start: 2024-12-19 | End: 2024-12-19 | Stop reason: HOSPADM

## 2024-12-18 RX ORDER — ASPIRIN 81 MG/1
81 TABLET ORAL DAILY
Status: DISCONTINUED | OUTPATIENT
Start: 2024-12-18 | End: 2024-12-19 | Stop reason: HOSPADM

## 2024-12-18 RX ORDER — ASPIRIN 81 MG/1
324 TABLET, CHEWABLE ORAL ONCE
Status: DISCONTINUED | OUTPATIENT
Start: 2024-12-18 | End: 2024-12-19 | Stop reason: HOSPADM

## 2024-12-18 RX ADMIN — HEPARIN SODIUM 4000 UNITS: 1000 INJECTION INTRAVENOUS; SUBCUTANEOUS at 07:27

## 2024-12-18 RX ADMIN — HEPARIN SODIUM 4000 UNITS: 1000 INJECTION INTRAVENOUS; SUBCUTANEOUS at 00:06

## 2024-12-18 RX ADMIN — HEPARIN SODIUM 16 UNITS/KG/HR: 10000 INJECTION, SOLUTION INTRAVENOUS at 11:04

## 2024-12-18 RX ADMIN — METRONIDAZOLE 500 MG: 500 INJECTION, SOLUTION INTRAVENOUS at 22:34

## 2024-12-18 RX ADMIN — POTASSIUM CHLORIDE 40 MEQ: 1500 TABLET, EXTENDED RELEASE ORAL at 12:09

## 2024-12-18 RX ADMIN — ARFORMOTEROL TARTRATE: 15 SOLUTION RESPIRATORY (INHALATION) at 08:18

## 2024-12-18 RX ADMIN — HEPARIN SODIUM 4000 UNITS: 1000 INJECTION INTRAVENOUS; SUBCUTANEOUS at 14:02

## 2024-12-18 RX ADMIN — METRONIDAZOLE 500 MG: 500 INJECTION, SOLUTION INTRAVENOUS at 14:09

## 2024-12-18 RX ADMIN — METOPROLOL TARTRATE 2.5 MG: 5 INJECTION INTRAVENOUS at 12:10

## 2024-12-18 RX ADMIN — METOPROLOL TARTRATE 2.5 MG: 5 INJECTION INTRAVENOUS at 00:05

## 2024-12-18 RX ADMIN — MONTELUKAST 10 MG: 10 TABLET, FILM COATED ORAL at 20:06

## 2024-12-18 RX ADMIN — METRONIDAZOLE 500 MG: 500 INJECTION, SOLUTION INTRAVENOUS at 07:40

## 2024-12-18 RX ADMIN — METOPROLOL TARTRATE 2.5 MG: 5 INJECTION INTRAVENOUS at 18:53

## 2024-12-18 RX ADMIN — ARFORMOTEROL TARTRATE: 15 SOLUTION RESPIRATORY (INHALATION) at 20:18

## 2024-12-18 RX ADMIN — METOPROLOL TARTRATE 2.5 MG: 5 INJECTION INTRAVENOUS at 07:36

## 2024-12-18 RX ADMIN — LEVOFLOXACIN 750 MG: 750 INJECTION, SOLUTION INTRAVENOUS at 05:44

## 2024-12-18 RX ADMIN — SODIUM CHLORIDE, PRESERVATIVE FREE 10 ML: 5 INJECTION INTRAVENOUS at 08:55

## 2024-12-18 NOTE — PROGRESS NOTES
Case management specialist met with patient to review and go over Important Message from Medicare letter in which patient acknowledged understanding and signed the letter. A copy of letter was left in room with patient.

## 2024-12-18 NOTE — PROGRESS NOTES
Unfortunately the patient has gone into atrial fibrillation.  She is currently on heparin and is being worked up by cardiology.  This will postpone her surgery.  At this time she appears to be getting better from her attack of cholecystitis.  Recommend maintaining her on a full liquid diet for now and may be advancing to a regular diet.  There are no plans to do surgery on her she can be discharged after her cardiac workup is completed.  She can follow-up with me in the office.

## 2024-12-18 NOTE — PLAN OF CARE
Problem: Pain  Goal: Verbalizes/displays adequate comfort level or baseline comfort level  12/17/2024 2350 by Meghan Boland RN  Outcome: Progressing  12/17/2024 2350 by Meghan Boland RN  Outcome: Progressing  12/17/2024 1435 by Thania Benedict RN  Outcome: Progressing     Problem: Discharge Planning  Goal: Discharge to home or other facility with appropriate resources  12/17/2024 2350 by Meghan Boland RN  Outcome: Progressing  12/17/2024 2350 by Meghan Boland RN  Outcome: Progressing  12/17/2024 1435 by Thania Benedict RN  Outcome: Progressing     Problem: Safety - Adult  Goal: Free from fall injury  Outcome: Progressing     Problem: Chronic Conditions and Co-morbidities  Goal: Patient's chronic conditions and co-morbidity symptoms are monitored and maintained or improved  Outcome: Progressing

## 2024-12-18 NOTE — PLAN OF CARE
Problem: Pain  Goal: Verbalizes/displays adequate comfort level or baseline comfort level  12/18/2024 0938 by Myrna Mahoney RN  Outcome: Progressing     Problem: Discharge Planning  Goal: Discharge to home or other facility with appropriate resources  12/18/2024 0938 by Myrna Mahoney RN  Outcome: Progressing     Problem: Safety - Adult  Goal: Free from fall injury  12/18/2024 0938 by Myrna Mahoney RN  Outcome: Progressing     Problem: Chronic Conditions and Co-morbidities  Goal: Patient's chronic conditions and co-morbidity symptoms are monitored and maintained or improved  12/18/2024 0938 by Myrna Mahoney RN  Outcome: Progressing     Will continue to monitor and answer questions as needed.  Myrna Mahoney RN

## 2024-12-19 ENCOUNTER — APPOINTMENT (OUTPATIENT)
Facility: HOSPITAL | Age: 79
DRG: 444 | End: 2024-12-19
Payer: MEDICARE

## 2024-12-19 VITALS
DIASTOLIC BLOOD PRESSURE: 75 MMHG | WEIGHT: 165 LBS | OXYGEN SATURATION: 98 % | TEMPERATURE: 98.6 F | HEIGHT: 63 IN | RESPIRATION RATE: 16 BRPM | BODY MASS INDEX: 29.23 KG/M2 | HEART RATE: 84 BPM | SYSTOLIC BLOOD PRESSURE: 119 MMHG

## 2024-12-19 PROBLEM — I48.0 PAF (PAROXYSMAL ATRIAL FIBRILLATION) (HCC): Status: ACTIVE | Noted: 2024-12-19

## 2024-12-19 LAB
ALBUMIN SERPL-MCNC: 2.6 G/DL (ref 3.4–5)
ALBUMIN/GLOB SERPL: 0.8 (ref 0.8–1.7)
ALP SERPL-CCNC: 202 U/L (ref 45–117)
ALT SERPL-CCNC: 53 U/L (ref 13–56)
ANION GAP SERPL CALC-SCNC: 6 MMOL/L (ref 3–18)
APTT PPP: 61.2 SEC (ref 23–36.4)
APTT PPP: 80.7 SEC (ref 23–36.4)
AST SERPL-CCNC: 34 U/L (ref 10–38)
BASOPHILS # BLD: 0 K/UL (ref 0–0.1)
BASOPHILS NFR BLD: 1 % (ref 0–2)
BILIRUB SERPL-MCNC: 3.6 MG/DL (ref 0.2–1)
BUN SERPL-MCNC: 8 MG/DL (ref 7–18)
BUN/CREAT SERPL: 16 (ref 12–20)
CALCIUM SERPL-MCNC: 8.3 MG/DL (ref 8.5–10.1)
CHLORIDE SERPL-SCNC: 109 MMOL/L (ref 100–111)
CO2 SERPL-SCNC: 24 MMOL/L (ref 21–32)
CREAT SERPL-MCNC: 0.5 MG/DL (ref 0.6–1.3)
DIFFERENTIAL METHOD BLD: ABNORMAL
ECHO BSA: 1.82 M2
EOSINOPHIL # BLD: 0 K/UL (ref 0–0.4)
EOSINOPHIL NFR BLD: 0 % (ref 0–5)
ERYTHROCYTE [DISTWIDTH] IN BLOOD BY AUTOMATED COUNT: 13.7 % (ref 11.6–14.5)
GLOBULIN SER CALC-MCNC: 3.3 G/DL (ref 2–4)
GLUCOSE BLD STRIP.AUTO-MCNC: 103 MG/DL (ref 70–110)
GLUCOSE BLD STRIP.AUTO-MCNC: 114 MG/DL (ref 70–110)
GLUCOSE BLD STRIP.AUTO-MCNC: 129 MG/DL (ref 70–110)
GLUCOSE SERPL-MCNC: 147 MG/DL (ref 74–99)
HCT VFR BLD AUTO: 32.3 % (ref 35–45)
HGB BLD-MCNC: 11 G/DL (ref 12–16)
IMM GRANULOCYTES # BLD AUTO: 0 K/UL (ref 0–0.04)
IMM GRANULOCYTES NFR BLD AUTO: 0 % (ref 0–0.5)
LYMPHOCYTES # BLD: 0.7 K/UL (ref 0.9–3.6)
LYMPHOCYTES NFR BLD: 10 % (ref 21–52)
MAGNESIUM SERPL-MCNC: 1.8 MG/DL (ref 1.6–2.6)
MCH RBC QN AUTO: 31.8 PG (ref 24–34)
MCHC RBC AUTO-ENTMCNC: 34.1 G/DL (ref 31–37)
MCV RBC AUTO: 93.4 FL (ref 78–100)
MONOCYTES # BLD: 0.5 K/UL (ref 0.05–1.2)
MONOCYTES NFR BLD: 7 % (ref 3–10)
NEUTS SEG # BLD: 5.6 K/UL (ref 1.8–8)
NEUTS SEG NFR BLD: 82 % (ref 40–73)
NRBC # BLD: 0 K/UL (ref 0–0.01)
NRBC BLD-RTO: 0 PER 100 WBC
NUC STRESS EJECTION FRACTION: 76 %
PLATELET # BLD AUTO: 154 K/UL (ref 135–420)
PMV BLD AUTO: 9.9 FL (ref 9.2–11.8)
POTASSIUM SERPL-SCNC: 3.2 MMOL/L (ref 3.5–5.5)
PROT SERPL-MCNC: 5.9 G/DL (ref 6.4–8.2)
RBC # BLD AUTO: 3.46 M/UL (ref 4.2–5.3)
SODIUM SERPL-SCNC: 139 MMOL/L (ref 136–145)
STRESS BASELINE DIAS BP: 76 MMHG
STRESS BASELINE HR: 93 BPM
STRESS BASELINE SYS BP: 174 MMHG
STRESS ESTIMATED WORKLOAD: 1 METS
STRESS PEAK DIAS BP: 76 MMHG
STRESS PEAK SYS BP: 174 MMHG
STRESS PERCENT HR ACHIEVED: 85 %
STRESS POST PEAK HR: 122 BPM
STRESS RATE PRESSURE PRODUCT: NORMAL BPM*MMHG
STRESS TARGET HR: 143 BPM
TID: 1.2
WBC # BLD AUTO: 6.8 K/UL (ref 4.6–13.2)

## 2024-12-19 PROCEDURE — 82962 GLUCOSE BLOOD TEST: CPT

## 2024-12-19 PROCEDURE — 6360000002 HC RX W HCPCS: Performed by: INTERNAL MEDICINE

## 2024-12-19 PROCEDURE — 85730 THROMBOPLASTIN TIME PARTIAL: CPT

## 2024-12-19 PROCEDURE — 3430000000 HC RX DIAGNOSTIC RADIOPHARMACEUTICAL: Performed by: INTERNAL MEDICINE

## 2024-12-19 PROCEDURE — 80053 COMPREHEN METABOLIC PANEL: CPT

## 2024-12-19 PROCEDURE — 83735 ASSAY OF MAGNESIUM: CPT

## 2024-12-19 PROCEDURE — 85025 COMPLETE CBC W/AUTO DIFF WBC: CPT

## 2024-12-19 PROCEDURE — 36415 COLL VENOUS BLD VENIPUNCTURE: CPT

## 2024-12-19 PROCEDURE — 78452 HT MUSCLE IMAGE SPECT MULT: CPT

## 2024-12-19 PROCEDURE — 6360000002 HC RX W HCPCS: Performed by: HOSPITALIST

## 2024-12-19 PROCEDURE — 94640 AIRWAY INHALATION TREATMENT: CPT

## 2024-12-19 PROCEDURE — 93017 CV STRESS TEST TRACING ONLY: CPT

## 2024-12-19 PROCEDURE — A9500 TC99M SESTAMIBI: HCPCS | Performed by: INTERNAL MEDICINE

## 2024-12-19 PROCEDURE — 6370000000 HC RX 637 (ALT 250 FOR IP): Performed by: INTERNAL MEDICINE

## 2024-12-19 RX ORDER — REGADENOSON 0.08 MG/ML
0.4 INJECTION, SOLUTION INTRAVENOUS
Status: COMPLETED | OUTPATIENT
Start: 2024-12-19 | End: 2024-12-19

## 2024-12-19 RX ORDER — POTASSIUM CHLORIDE 1500 MG/1
40 TABLET, EXTENDED RELEASE ORAL ONCE
Status: DISCONTINUED | OUTPATIENT
Start: 2024-12-19 | End: 2024-12-19 | Stop reason: HOSPADM

## 2024-12-19 RX ORDER — METRONIDAZOLE 500 MG/1
500 TABLET ORAL 3 TIMES DAILY
Qty: 21 TABLET | Refills: 0 | Status: SHIPPED | OUTPATIENT
Start: 2024-12-19 | End: 2024-12-19

## 2024-12-19 RX ORDER — TETRAKIS(2-METHOXYISOBUTYLISOCYANIDE)COPPER(I) TETRAFLUOROBORATE 1 MG/ML
28.2 INJECTION, POWDER, LYOPHILIZED, FOR SOLUTION INTRAVENOUS
Status: COMPLETED | OUTPATIENT
Start: 2024-12-19 | End: 2024-12-19

## 2024-12-19 RX ORDER — CIPROFLOXACIN 500 MG/1
500 TABLET, FILM COATED ORAL 2 TIMES DAILY
Qty: 14 TABLET | Refills: 0 | Status: SHIPPED | OUTPATIENT
Start: 2024-12-19 | End: 2024-12-26

## 2024-12-19 RX ORDER — METOPROLOL TARTRATE 25 MG/1
12.5 TABLET, FILM COATED ORAL 2 TIMES DAILY
Qty: 60 TABLET | Refills: 3 | Status: SHIPPED | OUTPATIENT
Start: 2024-12-19

## 2024-12-19 RX ORDER — TETRAKIS(2-METHOXYISOBUTYLISOCYANIDE)COPPER(I) TETRAFLUOROBORATE 1 MG/ML
9.3 INJECTION, POWDER, LYOPHILIZED, FOR SOLUTION INTRAVENOUS
Status: COMPLETED | OUTPATIENT
Start: 2024-12-19 | End: 2024-12-19

## 2024-12-19 RX ORDER — METOPROLOL TARTRATE 25 MG/1
12.5 TABLET, FILM COATED ORAL 2 TIMES DAILY
Qty: 60 TABLET | Refills: 3 | Status: SHIPPED | OUTPATIENT
Start: 2024-12-19 | End: 2024-12-19

## 2024-12-19 RX ORDER — METRONIDAZOLE 500 MG/1
500 TABLET ORAL 3 TIMES DAILY
Qty: 21 TABLET | Refills: 0 | Status: SHIPPED | OUTPATIENT
Start: 2024-12-19 | End: 2024-12-26

## 2024-12-19 RX ORDER — CIPROFLOXACIN 500 MG/1
500 TABLET, FILM COATED ORAL 2 TIMES DAILY
Qty: 14 TABLET | Refills: 0 | Status: SHIPPED | OUTPATIENT
Start: 2024-12-19 | End: 2024-12-19

## 2024-12-19 RX ADMIN — METRONIDAZOLE 500 MG: 500 INJECTION, SOLUTION INTRAVENOUS at 06:26

## 2024-12-19 RX ADMIN — TETRAKIS(2-METHOXYISOBUTYLISOCYANIDE)COPPER(I) TETRAFLUOROBORATE 28.2 MILLICURIE: 1 INJECTION, POWDER, LYOPHILIZED, FOR SOLUTION INTRAVENOUS at 10:50

## 2024-12-19 RX ADMIN — LEVOFLOXACIN 750 MG: 750 INJECTION, SOLUTION INTRAVENOUS at 04:44

## 2024-12-19 RX ADMIN — HEPARIN SODIUM 4000 UNITS: 1000 INJECTION INTRAVENOUS; SUBCUTANEOUS at 05:46

## 2024-12-19 RX ADMIN — TETRAKIS(2-METHOXYISOBUTYLISOCYANIDE)COPPER(I) TETRAFLUOROBORATE 9.3 MILLICURIE: 1 INJECTION, POWDER, LYOPHILIZED, FOR SOLUTION INTRAVENOUS at 08:55

## 2024-12-19 RX ADMIN — REGADENOSON 0.4 MG: 0.08 INJECTION, SOLUTION INTRAVENOUS at 10:50

## 2024-12-19 RX ADMIN — ARFORMOTEROL TARTRATE: 15 SOLUTION RESPIRATORY (INHALATION) at 08:01

## 2024-12-19 RX ADMIN — ASPIRIN 81 MG: 81 TABLET, COATED ORAL at 12:15

## 2024-12-19 RX ADMIN — METOPROLOL TARTRATE 12.5 MG: 25 TABLET, FILM COATED ORAL at 12:15

## 2024-12-19 NOTE — DISCHARGE SUMMARY
Hospitalist Discharge Summary    Patient: Juana Mckeon MRN: [id number]  CSN: [id number]    YOB: 1945  Age: 79 y.o.  Sex: female    DOA: 12/15/2024 LOS:  LOS: 2 days   Discharge Date:      Primary Care Provider:  Alka Mcgee MD    Admission Diagnoses: Acute cholecystitis [K81.0]  Cholecystitis, acute [K81.0]    Discharge Diagnoses:    Active Hospital Problems    Diagnosis     PAF (paroxysmal atrial fibrillation) (Formerly [facility]) [I48.0]     Acute cholecystitis [K81.0]     Cholecystitis, acute [K81.0]     Asthma [J45.909]        Discharge Medications:        Medication List        START taking these medications      apixaban 5 MG Tabs tablet  Commonly known as: ELIQUIS  Take 1 tablet by mouth 2 times daily     ciprofloxacin 500 MG tablet  Commonly known as: Cipro  Take 1 tablet by mouth 2 times daily for 7 days     metoprolol tartrate 25 MG tablet  Commonly known as: LOPRESSOR  Take 0.5 tablets by mouth 2 times daily     metroNIDAZOLE 500 MG tablet  Commonly known as: FLAGYL  Take 1 tablet by mouth 3 times daily for 7 days            CONTINUE taking these medications      acetaminophen 325 MG tablet  Commonly known as: TYLENOL     clotrimazole 1 % cream  Commonly known as: LOTRIMIN     clotrimazole-betamethasone 1-0.05 % cream  Commonly known as: LOTRISONE     cycloSPORINE 0.05 % ophthalmic emulsion  Commonly known as: RESTASIS     Fish Oil Adult Gummies 113.5 MG Chew     fluticasone 50 MCG/ACT nasal spray  Commonly known as: FLONASE     montelukast 10 MG tablet  Commonly known as: SINGULAIR     pentoxifylline 400 MG extended release tablet  Commonly known as: TRENTAL     Symbicort 80-4.5 MCG/ACT Aero  Generic drug: budesonide-formoterol     * triamcinolone 0.025 % cream  Commonly known as: KENALOG     * triamcinolone 0.1 % cream  Commonly known as: KENALOG     Ventolin HFA  ([employer]) to Dr. Santiago Hazel on 12/17/2024 9:34 AM by Dr. Lloyd Stoddard.  [id number] Electronically signed by Lloyd Stoddard     GALLBLADDER RUQ    Result Date: 12/16/2024  INDICATION:   RUQ abdominal pain, please evaluate gallbladder COMPARISON:  None FINDINGS: Limited right upper quadrant ultrasound was performed. Gallbladder:   Large amount of echogenic material within the gallbladder some of which has shadowing. No wall thickening. Mild pericholecystic fluid and positive sonographic Hurst's sign. Common Bile Duct: 4 mm. Liver: Heterogeneous/echogenic. Main Portal Vein:  Patent and appropriate hepatopetal flow. Pancreas Head (Body and Tail not evaluated):  Obscured by bowel gas. Right kidney: 9.0 cm in length. No nephrolithiasis or hydronephrosis. Other:  None.     Large amount of stones/sludge within the gallbladder, with mild pericholecystic fluid and positive sonographic Hurst's sign concerning for acute cholecystitis. Electronically signed by Cm Winston        @[email or web address])@       Please note that this dictation was completed with [employer], the computer voice recognition software.  Quite often unanticipated grammatical, syntax, homophones, and other interpretive errors are inadvertently transcribed by the computer software.  Please disregard these errors.  Please excuse any errors that have escaped final proofreading.     Dear patient, if you are reviewing this note and have a question regarding the medical terminology, please bring it with you to your next PCP visit.  Medical notes are meant to be a communication between medical professionals.  Additionally, portion of this note were created using voice recognition function, syntax and phonetic over may have escaped proofreading.    CC: Alka Mcgee MD

## 2024-12-19 NOTE — PLAN OF CARE
Problem: Pain  Goal: Verbalizes/displays adequate comfort level or baseline comfort level  12/19/2024 1638 by Tyrone Romero RN  Outcome: Completed  12/19/2024 1133 by Tyrone Romero RN  Outcome: Progressing     Problem: Discharge Planning  Goal: Discharge to home or other facility with appropriate resources  12/19/2024 1638 by Tyrone Romero RN  Outcome: Completed  12/19/2024 1133 by Tyrone Romero RN  Outcome: Progressing  Flowsheets (Taken 12/19/2024 0801)  Discharge to home or other facility with appropriate resources:   Identify barriers to discharge with patient and caregiver   Identify discharge learning needs (meds, wound care, etc)   Arrange for needed discharge resources and transportation as appropriate     Problem: Safety - Adult  Goal: Free from fall injury  12/19/2024 1638 by Tyrone Romero RN  Outcome: Completed  12/19/2024 1133 by Tyrone Romero RN  Outcome: Progressing     Problem: Chronic Conditions and Co-morbidities  Goal: Patient's chronic conditions and co-morbidity symptoms are monitored and maintained or improved  12/19/2024 1638 by Tyrone Romero RN  Outcome: Completed  12/19/2024 1133 by Tyrone Romero RN  Outcome: Progressing  Flowsheets (Taken 12/19/2024 0801)  Care Plan - Patient's Chronic Conditions and Co-Morbidity Symptoms are Monitored and Maintained or Improved:   Monitor and assess patient's chronic conditions and comorbid symptoms for stability, deterioration, or improvement   Collaborate with multidisciplinary team to address chronic and comorbid conditions and prevent exacerbation or deterioration   Update acute care plan with appropriate goals if chronic or comorbid symptoms are exacerbated and prevent overall improvement and discharge

## 2024-12-19 NOTE — PLAN OF CARE
Problem: Pain  Goal: Verbalizes/displays adequate comfort level or baseline comfort level  12/18/2024 2208 by Meghan Boland RN  Outcome: Progressing  12/18/2024 0938 by Myrna Mahoney RN  Outcome: Progressing     Problem: Safety - Adult  Goal: Free from fall injury  12/18/2024 2208 by Meghan Boland RN  Outcome: Progressing  12/18/2024 0938 by Myrna Mahoney RN  Outcome: Progressing     Problem: Chronic Conditions and Co-morbidities  Goal: Patient's chronic conditions and co-morbidity symptoms are monitored and maintained or improved  12/18/2024 2208 by Meghan Boland RN  Outcome: Progressing  12/18/2024 0938 by Myrna Mahoney RN  Outcome: Progressing     Problem: Discharge Planning  Goal: Discharge to home or other facility with appropriate resources  12/18/2024 2208 by Meghan Boland RN  Outcome: Progressing  12/18/2024 0938 by Myrna Mahoney RN  Outcome: Progressing

## 2024-12-19 NOTE — PROGRESS NOTES
Patient's surgery is canceled secondary to cardiac issues.  She may follow-up in the office with me once she is discharged and I can discuss further plans if needed.  Will sign off at this

## 2024-12-19 NOTE — PROGRESS NOTES
Cardiology Progress Note        Patient: Juana Mckeon        Sex: female          DOA: 12/15/2024  YOB: 1945      Age:  79 y.o.        LOS:  LOS: 2 days     Patient seen and examined, chart reviewed.    Assessment/Plan     Patient Active Problem List   Diagnosis    Cholecystitis, acute    Asthma    Acute cholecystitis      Atrial fibrillation with rapid ventricular rate   Paroxysmal atrial fibrillation   Abnormal troponin No clear evidence of acute coronary syndrome, could be demand ischemia     TSH normal      Abdominal ultrasound reported     Large amount of stones/sludge within the gallbladder, with mild pericholecystic  fluid and positive sonographic Hurst's sign concerning for acute cholecystitis.     CT abdomen reported     1. Acute cholecystitis.  2. Moderate paraesophageal hernia. Nonobstructive organoaxial gastric volvulus.      Echocardiogram revealed       Image quality is good.    Left Ventricle: Normal left ventricular systolic function with a visually estimated EF of 60 - 65%. Left ventricle size is normal. Normal wall thickness. Normal wall motion. Grade I diastolic dysfunction present with normal LV EF.    Tricuspid Valve: Mild regurgitation. The estimated RVSP is 30 mmHg.     Telemetry monitor reviewed no further episode of paroxysmal atrial fibrillation    Lexiscan stress test is negative for ischemia     Plan:    Continue Metoprolol  Discontinue IV heparin and start Eliquis  Plan discussed with patient's family member  Plan discussed with   Follow up in cardiology clinic in 4-6 weeks after discharge               Subjective:    cc:   Denies any palpitation, complaining of diarrhea      REVIEW OF SYSTEMS:     General: No fevers or chills.  Cardiovascular: No chest pain,No palpitations, No orthopnea, No PND, No leg swelling, No claudication  Pulmonary: No dyspnea.   Gastrointestinal: No nausea, vomiting, bleeding  Neurology: No

## 2024-12-19 NOTE — PLAN OF CARE
Problem: Pain  Goal: Verbalizes/displays adequate comfort level or baseline comfort level  12/19/2024 1133 by Tyrone Romero RN  Outcome: Progressing  12/18/2024 2208 by Meghan Boland RN  Outcome: Progressing     Problem: Discharge Planning  Goal: Discharge to home or other facility with appropriate resources  12/19/2024 1133 by Tyrone Romero RN  Outcome: Progressing  Flowsheets (Taken 12/19/2024 0801)  Discharge to home or other facility with appropriate resources:   Identify barriers to discharge with patient and caregiver   Identify discharge learning needs (meds, wound care, etc)   Arrange for needed discharge resources and transportation as appropriate  12/18/2024 2208 by Meghan Boland RN  Outcome: Progressing     Problem: Safety - Adult  Goal: Free from fall injury  12/19/2024 1133 by Tyrone Romero RN  Outcome: Progressing  12/18/2024 2208 by Meghan Boland RN  Outcome: Progressing     Problem: Chronic Conditions and Co-morbidities  Goal: Patient's chronic conditions and co-morbidity symptoms are monitored and maintained or improved  12/19/2024 1133 by Tyrone Romero RN  Outcome: Progressing  Flowsheets (Taken 12/19/2024 0801)  Care Plan - Patient's Chronic Conditions and Co-Morbidity Symptoms are Monitored and Maintained or Improved:   Monitor and assess patient's chronic conditions and comorbid symptoms for stability, deterioration, or improvement   Collaborate with multidisciplinary team to address chronic and comorbid conditions and prevent exacerbation or deterioration   Update acute care plan with appropriate goals if chronic or comorbid symptoms are exacerbated and prevent overall improvement and discharge  12/18/2024 2208 by Meghan Boland RN  Outcome: Progressing

## 2024-12-19 NOTE — PROGRESS NOTES
Cardiology Progress Note        Patient: Juana Mckeon        Sex: female          DOA: 12/15/2024  YOB: 1945      Age:  79 y.o.        LOS:  LOS: 1 day     Patient seen and examined, chart reviewed.    Assessment/Plan     Patient Active Problem List   Diagnosis    Cholecystitis, acute    Asthma    Acute cholecystitis      Atrial fibrillation with rapid ventricular rate   Paroxysmal atrial fibrillation   Abnormal troponin No clear evidence of acute coronary syndrome, could be demand ischemia     TSH normal      Abdominal ultrasound reported     Large amount of stones/sludge within the gallbladder, with mild pericholecystic  fluid and positive sonographic Hurst's sign concerning for acute cholecystitis.     CT abdomen reported     1. Acute cholecystitis.  2. Moderate paraesophageal hernia. Nonobstructive organoaxial gastric volvulus.      Echocardiogram revealed       Image quality is good.    Left Ventricle: Normal left ventricular systolic function with a visually estimated EF of 60 - 65%. Left ventricle size is normal. Normal wall thickness. Normal wall motion. Grade I diastolic dysfunction present with normal LV EF.    Tricuspid Valve: Mild regurgitation. The estimated RVSP is 30 mmHg.     Telemetry monitor reviewed no further episode of paroxysmal atrial fibrillation    Plan:     Discontinue IV metoprolol   Start metoprolol tartrate 12.5 mg by mouth twice a day.    Continue aspirin and IV heparin   Patient has acute cholecystitis however she will not go for surgery at this point   Discussed with patient about further ischemia workup in view of paroxysmal atrial fibrillation and abnormal troponin.    Patient agreed for stress test   Schedule for Lexiscan stress test tomorrow   NPO after midnight   Plan discussed with patient's family member              Subjective:    cc:   Denies any palpitation, complaining of diarrhea      REVIEW OF SYSTEMS:     General: No fevers

## 2024-12-19 NOTE — PROGRESS NOTES
recommended stat CT abdomen pelvis which showed similar findings.  Follow liver function  Patient feeling better, surgery cancelled due to A-fib and elevated troponin.    PAF  Now NSR  On lopressor  Anticoagulation with heparin drip  Cardiology following    Asthma  No active wheezing  DuoNebs as needed    DVT Prophylaxis:  []Lovenox SQ  [] Heparin SQ  [] SCDs  [] Coumadin, Eliquis, Xarelto,   [x] On Heparin gtt or therapeutic Lovenox. [] Patient refused.              Case discussed with:  [x]Patient  [x]Family [x] Consultants  [x]Nursing  []Case Management   [] Others      Discharge to;  [x] Home  [] Home Health  [] SNF/Rehab  [] LTAC. In 2 to 3 days days    Review of systems : As above and,  General: No fevers or chills.  Cardiovascular: No chest pain or pressure. No palpitations.   Pulmonary: No shortness of breath.   Gastrointestinal: No nausea, vomiting.     Physical Exam: As above and,  General: Awake, cooperative, no acute distress    HEENT: NC, Atraumatic.  PERRLA, anicteric sclerae.  Lungs: CTA Bilaterally. No Wheezing/Rhonchi/Rales.  Heart:  S1 S2,  No murmur, No Rubs, No Gallops  Abdomen: Soft, Non distended, right upper quadrant tender.  +Bowel sounds,   Extremities: No c/c/e  Psych:   Not anxious or agitated.  Neurologic:  No acute neurological deficit.         Vital signs/Intake and Output:  Visit Vitals  /65   Pulse 83   Temp 98.8 °F (37.1 °C) (Oral)   Resp 18   Ht 1.6 m (5' 3\")   Wt 74.8 kg (165 lb)   SpO2 100%   BMI 29.23 kg/m²     Current Shift:  No intake/output data recorded.  Last three shifts:  12/17 0701 - 12/18 1900  In: 550 [P.O.:550]  Out: -             Labs: Results:       Chemistry Recent Labs     12/17/24  0515 12/17/24  1445 12/18/24  0600   * 135* 137   K 3.6 3.6 3.4*    106 109   CO2 24 23 22   BUN 7 10 9   GLOB 4.0 4.0 3.2   ,No results found for: \"LACTA\"   CBC w/Diff Recent Labs     12/17/24  0515 12/17/24  1445 12/18/24  0600   WBC 13.0 14.7* 11.2   RBC 4.08* 3.93*  3.53*   HGB 13.0 12.6 11.0*   HCT 37.7 36.2 33.1*   * 141 143      Cardiac Enzymes Lab Results   Component Value Date    TROPHS 369 (HH) 12/18/2024   ,No results found for: \"BNP\"   Coagulation Recent Labs     12/18/24  1302 12/18/24  1806   APTT 40.6* >180.0*       Lipid Panel No results found for: \"CHOL\", \"CHOLPOCT\", \"CHLST\", \"CHOLV\", \"169233\", \"HDL\", \"HDLC\", \"LDL\", \"LDLC\", \"VLDLC\", \"VLDL\"   Pancreas No results for input(s): \"LIPASE\" in the last 72 hours.  ,No results for input(s): \"AMYLASE\" in the last 72 hours.   Liver Enzymes No results for input(s): \"TP\" in the last 72 hours.    Invalid input(s): \"ALB\", \"TBIL\", \"AP\", \"SGOT\", \"GPT\", \"DBIL\"   Thyroid Studies Lab Results   Component Value Date/Time    TSH 2.02 12/17/2024 11:13 AM        Procedures/imaging: see electronic medical records for all procedures/Xrays and details which were not copied into this note but were reviewed prior to creation of Plan    TIME: E/M Time spent with patient and patient care issues: 55 mins.     This time also includes physician non-face-to-face service time visit on the date of service such as  Preparing to see the patient (eg, review of tests)  Obtaining and/or reviewing separately obtained history  Performing a medically necessary appropriate examination and/or evaluation  Counseling and educating the patient/family/caregiver  Ordering medications, tests, or procedures  Referring and communicating with other health care professionals as needed  Documenting clinical information in the electronic or other health record  Independently interpreting results (not reported separately) and communicating results to the patient/family/caregiver  Care coordination and discharge planning with Case Management.    Dear patient or family member or Caretaker, if you are reviewing this note and have a question regarding the medical terminology, please bring it with you to your next PCP visit.  Medical notes are meant to be a communication